# Patient Record
Sex: MALE | Race: WHITE | NOT HISPANIC OR LATINO | URBAN - METROPOLITAN AREA
[De-identification: names, ages, dates, MRNs, and addresses within clinical notes are randomized per-mention and may not be internally consistent; named-entity substitution may affect disease eponyms.]

---

## 2017-01-20 ENCOUNTER — INPATIENT (INPATIENT)
Facility: HOSPITAL | Age: 82
LOS: 1 days | Discharge: HOME | End: 2017-01-22
Attending: INTERNAL MEDICINE | Admitting: INTERNAL MEDICINE
Payer: MEDICARE

## 2017-01-20 VITALS
SYSTOLIC BLOOD PRESSURE: 123 MMHG | TEMPERATURE: 210 F | HEART RATE: 103 BPM | OXYGEN SATURATION: 91 % | DIASTOLIC BLOOD PRESSURE: 76 MMHG | RESPIRATION RATE: 22 BRPM

## 2017-01-20 DIAGNOSIS — Z93.1 GASTROSTOMY STATUS: Chronic | ICD-10-CM

## 2017-01-20 DIAGNOSIS — J06.9 ACUTE UPPER RESPIRATORY INFECTION, UNSPECIFIED: ICD-10-CM

## 2017-01-20 DIAGNOSIS — I48.91 UNSPECIFIED ATRIAL FIBRILLATION: ICD-10-CM

## 2017-01-20 DIAGNOSIS — E86.0 DEHYDRATION: ICD-10-CM

## 2017-01-20 DIAGNOSIS — R13.10 DYSPHAGIA, UNSPECIFIED: ICD-10-CM

## 2017-01-20 DIAGNOSIS — I63.9 CEREBRAL INFARCTION, UNSPECIFIED: ICD-10-CM

## 2017-01-20 LAB
ALBUMIN SERPL ELPH-MCNC: 4 G/DL — SIGNIFICANT CHANGE UP (ref 3.3–5)
ALP SERPL-CCNC: 84 U/L — SIGNIFICANT CHANGE UP (ref 40–120)
ALT FLD-CCNC: 10 U/L — SIGNIFICANT CHANGE UP (ref 4–41)
APPEARANCE UR: CLEAR — SIGNIFICANT CHANGE UP
APTT BLD: 29.9 SEC — SIGNIFICANT CHANGE UP (ref 27.5–37.4)
AST SERPL-CCNC: 32 U/L — SIGNIFICANT CHANGE UP (ref 4–40)
B PERT DNA SPEC QL NAA+PROBE: SIGNIFICANT CHANGE UP
BACTERIA # UR AUTO: SIGNIFICANT CHANGE UP
BASE EXCESS BLDV CALC-SCNC: 4.8 MMOL/L — SIGNIFICANT CHANGE UP
BASOPHILS # BLD AUTO: 0.03 K/UL — SIGNIFICANT CHANGE UP (ref 0–0.2)
BASOPHILS NFR BLD AUTO: 0.3 % — SIGNIFICANT CHANGE UP (ref 0–2)
BILIRUB SERPL-MCNC: 0.4 MG/DL — SIGNIFICANT CHANGE UP (ref 0.2–1.2)
BILIRUB UR-MCNC: NEGATIVE — SIGNIFICANT CHANGE UP
BLOOD GAS VENOUS - CREATININE: 0.59 MG/DL — SIGNIFICANT CHANGE UP (ref 0.5–1.3)
BLOOD UR QL VISUAL: NEGATIVE — SIGNIFICANT CHANGE UP
BUN SERPL-MCNC: 16 MG/DL — SIGNIFICANT CHANGE UP (ref 7–23)
BUN SERPL-MCNC: 16 MG/DL — SIGNIFICANT CHANGE UP (ref 7–23)
C PNEUM DNA SPEC QL NAA+PROBE: NOT DETECTED — SIGNIFICANT CHANGE UP
CALCIUM SERPL-MCNC: 9.1 MG/DL — SIGNIFICANT CHANGE UP (ref 8.4–10.5)
CALCIUM SERPL-MCNC: 9.1 MG/DL — SIGNIFICANT CHANGE UP (ref 8.4–10.5)
CHLORIDE BLDV-SCNC: 106 MMOL/L — SIGNIFICANT CHANGE UP (ref 96–108)
CHLORIDE SERPL-SCNC: 100 MMOL/L — SIGNIFICANT CHANGE UP (ref 98–107)
CHLORIDE SERPL-SCNC: 101 MMOL/L — SIGNIFICANT CHANGE UP (ref 98–107)
CK MB BLD-MCNC: 1.91 NG/ML — SIGNIFICANT CHANGE UP (ref 1–6.6)
CK MB BLD-MCNC: SIGNIFICANT CHANGE UP (ref 0–2.5)
CK SERPL-CCNC: 123 U/L — SIGNIFICANT CHANGE UP (ref 30–200)
CO2 SERPL-SCNC: 21 MMOL/L — LOW (ref 22–31)
CO2 SERPL-SCNC: 28 MMOL/L — SIGNIFICANT CHANGE UP (ref 22–31)
COLOR SPEC: YELLOW — SIGNIFICANT CHANGE UP
CREAT SERPL-MCNC: 0.61 MG/DL — SIGNIFICANT CHANGE UP (ref 0.5–1.3)
CREAT SERPL-MCNC: 0.62 MG/DL — SIGNIFICANT CHANGE UP (ref 0.5–1.3)
EOSINOPHIL # BLD AUTO: 0.22 K/UL — SIGNIFICANT CHANGE UP (ref 0–0.5)
EOSINOPHIL NFR BLD AUTO: 2.2 % — SIGNIFICANT CHANGE UP (ref 0–6)
FLUAV H1 2009 PAND RNA SPEC QL NAA+PROBE: NOT DETECTED — SIGNIFICANT CHANGE UP
FLUAV H1 RNA SPEC QL NAA+PROBE: NOT DETECTED — SIGNIFICANT CHANGE UP
FLUAV H3 RNA SPEC QL NAA+PROBE: NOT DETECTED — SIGNIFICANT CHANGE UP
FLUAV SUBTYP SPEC NAA+PROBE: SIGNIFICANT CHANGE UP
FLUBV RNA SPEC QL NAA+PROBE: NOT DETECTED — SIGNIFICANT CHANGE UP
GAS PNL BLDV: 135 MMOL/L — LOW (ref 136–146)
GLUCOSE BLDV-MCNC: 107 — HIGH (ref 70–99)
GLUCOSE SERPL-MCNC: 102 MG/DL — HIGH (ref 70–99)
GLUCOSE SERPL-MCNC: 106 MG/DL — HIGH (ref 70–99)
GLUCOSE UR-MCNC: NEGATIVE — SIGNIFICANT CHANGE UP
HADV DNA SPEC QL NAA+PROBE: NOT DETECTED — SIGNIFICANT CHANGE UP
HCO3 BLDV-SCNC: 28 MMOL/L — HIGH (ref 20–27)
HCOV 229E RNA SPEC QL NAA+PROBE: NOT DETECTED — SIGNIFICANT CHANGE UP
HCOV HKU1 RNA SPEC QL NAA+PROBE: NOT DETECTED — SIGNIFICANT CHANGE UP
HCOV NL63 RNA SPEC QL NAA+PROBE: NOT DETECTED — SIGNIFICANT CHANGE UP
HCOV OC43 RNA SPEC QL NAA+PROBE: NOT DETECTED — SIGNIFICANT CHANGE UP
HCT VFR BLD CALC: 38.8 % — LOW (ref 39–50)
HCT VFR BLD CALC: 40.1 % — SIGNIFICANT CHANGE UP (ref 39–50)
HCT VFR BLDV CALC: 38.2 % — LOW (ref 39–51)
HGB BLD-MCNC: 12.3 G/DL — LOW (ref 13–17)
HGB BLD-MCNC: 12.7 G/DL — LOW (ref 13–17)
HGB BLDV-MCNC: 12.4 G/DL — LOW (ref 13–17)
HMPV RNA SPEC QL NAA+PROBE: NOT DETECTED — SIGNIFICANT CHANGE UP
HPIV1 RNA SPEC QL NAA+PROBE: NOT DETECTED — SIGNIFICANT CHANGE UP
HPIV2 RNA SPEC QL NAA+PROBE: NOT DETECTED — SIGNIFICANT CHANGE UP
HPIV3 RNA SPEC QL NAA+PROBE: NOT DETECTED — SIGNIFICANT CHANGE UP
HPIV4 RNA SPEC QL NAA+PROBE: NOT DETECTED — SIGNIFICANT CHANGE UP
IMM GRANULOCYTES NFR BLD AUTO: 0.1 % — SIGNIFICANT CHANGE UP (ref 0–1.5)
INR BLD: 2.37 — HIGH (ref 0.87–1.18)
KETONES UR-MCNC: NEGATIVE — SIGNIFICANT CHANGE UP
LACTATE BLDV-MCNC: 2 MMOL/L — SIGNIFICANT CHANGE UP (ref 0.5–2)
LACTATE SERPL-SCNC: 1.1 MMOL/L — SIGNIFICANT CHANGE UP (ref 0.5–2)
LEUKOCYTE ESTERASE UR-ACNC: HIGH
LYMPHOCYTES # BLD AUTO: 1.5 K/UL — SIGNIFICANT CHANGE UP (ref 1–3.3)
LYMPHOCYTES # BLD AUTO: 15 % — SIGNIFICANT CHANGE UP (ref 13–44)
M PNEUMO DNA SPEC QL NAA+PROBE: NOT DETECTED — SIGNIFICANT CHANGE UP
MCHC RBC-ENTMCNC: 29.8 PG — SIGNIFICANT CHANGE UP (ref 27–34)
MCHC RBC-ENTMCNC: 30.1 PG — SIGNIFICANT CHANGE UP (ref 27–34)
MCHC RBC-ENTMCNC: 31.7 % — LOW (ref 32–36)
MCHC RBC-ENTMCNC: 31.7 % — LOW (ref 32–36)
MCV RBC AUTO: 94.1 FL — SIGNIFICANT CHANGE UP (ref 80–100)
MCV RBC AUTO: 95.1 FL — SIGNIFICANT CHANGE UP (ref 80–100)
MONOCYTES # BLD AUTO: 1.15 K/UL — HIGH (ref 0–0.9)
MONOCYTES NFR BLD AUTO: 11.5 % — SIGNIFICANT CHANGE UP (ref 2–14)
MUCOUS THREADS # UR AUTO: SIGNIFICANT CHANGE UP
NEUTROPHILS # BLD AUTO: 7.1 K/UL — SIGNIFICANT CHANGE UP (ref 1.8–7.4)
NEUTROPHILS NFR BLD AUTO: 70.9 % — SIGNIFICANT CHANGE UP (ref 43–77)
NITRITE UR-MCNC: NEGATIVE — SIGNIFICANT CHANGE UP
PCO2 BLDV: 49 MMHG — SIGNIFICANT CHANGE UP (ref 41–51)
PH BLDV: 7.4 PH — SIGNIFICANT CHANGE UP (ref 7.32–7.43)
PH UR: 6 — SIGNIFICANT CHANGE UP (ref 4.6–8)
PLATELET # BLD AUTO: 176 K/UL — SIGNIFICANT CHANGE UP (ref 150–400)
PLATELET # BLD AUTO: 180 K/UL — SIGNIFICANT CHANGE UP (ref 150–400)
PMV BLD: 12.4 FL — SIGNIFICANT CHANGE UP (ref 7–13)
PMV BLD: 12.8 FL — SIGNIFICANT CHANGE UP (ref 7–13)
PO2 BLDV: 52 MMHG — HIGH (ref 35–40)
POTASSIUM BLDV-SCNC: 4.8 MMOL/L — HIGH (ref 3.4–4.5)
POTASSIUM SERPL-MCNC: 4.4 MMOL/L — SIGNIFICANT CHANGE UP (ref 3.5–5.3)
POTASSIUM SERPL-MCNC: 4.9 MMOL/L — SIGNIFICANT CHANGE UP (ref 3.5–5.3)
POTASSIUM SERPL-SCNC: 4.4 MMOL/L — SIGNIFICANT CHANGE UP (ref 3.5–5.3)
POTASSIUM SERPL-SCNC: 4.9 MMOL/L — SIGNIFICANT CHANGE UP (ref 3.5–5.3)
PROT SERPL-MCNC: 7.9 G/DL — SIGNIFICANT CHANGE UP (ref 6–8.3)
PROT UR-MCNC: 30 — SIGNIFICANT CHANGE UP
PROTHROM AB SERPL-ACNC: 27.2 SEC — HIGH (ref 10–13.1)
RBC # BLD: 4.08 M/UL — LOW (ref 4.2–5.8)
RBC # BLD: 4.26 M/UL — SIGNIFICANT CHANGE UP (ref 4.2–5.8)
RBC # FLD: 15 % — HIGH (ref 10.3–14.5)
RBC # FLD: 15 % — HIGH (ref 10.3–14.5)
RBC CASTS # UR COMP ASSIST: HIGH (ref 0–?)
RSV RNA SPEC QL NAA+PROBE: NOT DETECTED — SIGNIFICANT CHANGE UP
RV+EV RNA SPEC QL NAA+PROBE: POSITIVE — HIGH
SAO2 % BLDV: 85.7 % — HIGH (ref 60–85)
SODIUM SERPL-SCNC: 140 MMOL/L — SIGNIFICANT CHANGE UP (ref 135–145)
SODIUM SERPL-SCNC: 141 MMOL/L — SIGNIFICANT CHANGE UP (ref 135–145)
SP GR SPEC: 1.02 — SIGNIFICANT CHANGE UP (ref 1–1.03)
SQUAMOUS # UR AUTO: SIGNIFICANT CHANGE UP
TROPONIN T SERPL-MCNC: < 0.06 NG/ML — SIGNIFICANT CHANGE UP (ref 0–0.06)
UROBILINOGEN FLD QL: 1 E.U. — SIGNIFICANT CHANGE UP (ref 0.1–0.2)
WBC # BLD: 10.01 K/UL — SIGNIFICANT CHANGE UP (ref 3.8–10.5)
WBC # BLD: 8.68 K/UL — SIGNIFICANT CHANGE UP (ref 3.8–10.5)
WBC # FLD AUTO: 10.01 K/UL — SIGNIFICANT CHANGE UP (ref 3.8–10.5)
WBC # FLD AUTO: 8.68 K/UL — SIGNIFICANT CHANGE UP (ref 3.8–10.5)
WBC UR QL: HIGH (ref 0–?)

## 2017-01-20 PROCEDURE — 71010: CPT | Mod: 26

## 2017-01-20 PROCEDURE — 99223 1ST HOSP IP/OBS HIGH 75: CPT

## 2017-01-20 RX ORDER — SENNA PLUS 8.6 MG/1
10 TABLET ORAL
Qty: 0 | Refills: 0 | Status: DISCONTINUED | OUTPATIENT
Start: 2017-01-20 | End: 2017-01-22

## 2017-01-20 RX ORDER — WARFARIN SODIUM 2.5 MG/1
6 TABLET ORAL ONCE
Qty: 0 | Refills: 0 | Status: COMPLETED | OUTPATIENT
Start: 2017-01-20 | End: 2017-01-20

## 2017-01-20 RX ORDER — RISPERIDONE 4 MG/1
0.5 TABLET ORAL
Qty: 0 | Refills: 0 | Status: DISCONTINUED | OUTPATIENT
Start: 2017-01-20 | End: 2017-01-22

## 2017-01-20 RX ORDER — VALPROIC ACID (AS SODIUM SALT) 250 MG/5ML
250 SOLUTION, ORAL ORAL
Qty: 0 | Refills: 0 | Status: DISCONTINUED | OUTPATIENT
Start: 2017-01-20 | End: 2017-01-20

## 2017-01-20 RX ORDER — ACETAMINOPHEN 500 MG
325 TABLET ORAL EVERY 6 HOURS
Qty: 0 | Refills: 0 | Status: DISCONTINUED | OUTPATIENT
Start: 2017-01-20 | End: 2017-01-20

## 2017-01-20 RX ORDER — ENOXAPARIN SODIUM 100 MG/ML
40 INJECTION SUBCUTANEOUS EVERY 24 HOURS
Qty: 0 | Refills: 0 | Status: DISCONTINUED | OUTPATIENT
Start: 2017-01-20 | End: 2017-01-20

## 2017-01-20 RX ORDER — DOCUSATE SODIUM 100 MG
25 CAPSULE ORAL
Qty: 0 | Refills: 0 | COMMUNITY

## 2017-01-20 RX ORDER — PANTOPRAZOLE SODIUM 20 MG/1
40 TABLET, DELAYED RELEASE ORAL DAILY
Qty: 0 | Refills: 0 | Status: DISCONTINUED | OUTPATIENT
Start: 2017-01-20 | End: 2017-01-22

## 2017-01-20 RX ORDER — MIRTAZAPINE 45 MG/1
15 TABLET, ORALLY DISINTEGRATING ORAL
Qty: 0 | Refills: 0 | Status: DISCONTINUED | OUTPATIENT
Start: 2017-01-20 | End: 2017-01-22

## 2017-01-20 RX ORDER — IPRATROPIUM/ALBUTEROL SULFATE 18-103MCG
3 AEROSOL WITH ADAPTER (GRAM) INHALATION ONCE
Qty: 0 | Refills: 0 | Status: COMPLETED | OUTPATIENT
Start: 2017-01-20 | End: 2017-01-20

## 2017-01-20 RX ORDER — ACETAMINOPHEN 500 MG
650 TABLET ORAL EVERY 6 HOURS
Qty: 0 | Refills: 0 | Status: DISCONTINUED | OUTPATIENT
Start: 2017-01-20 | End: 2017-01-22

## 2017-01-20 RX ORDER — SODIUM CHLORIDE 9 MG/ML
1000 INJECTION INTRAMUSCULAR; INTRAVENOUS; SUBCUTANEOUS
Qty: 0 | Refills: 0 | Status: DISCONTINUED | OUTPATIENT
Start: 2017-01-20 | End: 2017-01-22

## 2017-01-20 RX ORDER — SENNA PLUS 8.6 MG/1
5 TABLET ORAL
Qty: 0 | Refills: 0 | COMMUNITY

## 2017-01-20 RX ADMIN — Medication 3 MILLILITER(S): at 08:00

## 2017-01-20 RX ADMIN — WARFARIN SODIUM 6 MILLIGRAM(S): 2.5 TABLET ORAL at 19:14

## 2017-01-20 RX ADMIN — SODIUM CHLORIDE 60 MILLILITER(S): 9 INJECTION INTRAMUSCULAR; INTRAVENOUS; SUBCUTANEOUS at 19:05

## 2017-01-20 RX ADMIN — SODIUM CHLORIDE 60 MILLILITER(S): 9 INJECTION INTRAMUSCULAR; INTRAVENOUS; SUBCUTANEOUS at 21:03

## 2017-01-20 NOTE — ED PROVIDER NOTE - OBJECTIVE STATEMENT
81yom w/ Afib, CVA w/ residual right sided hemiparesis p/w cough and dyspnea since last night. Per home aide, pt was coughing through the night, productive of scant clear sputum, and then this morning it looked like he was short of breath so EMS was called. Pt minimally verbal at baseline, provides no history. No known fevers, vomiting, diarrhea, strong smelling urine 81yom w/ Afib, CVA w/ residual right sided hemiparesis, dysphagia/chronic aspiration w/ PEG p/w cough and dyspnea since last night. Per home aide, pt was coughing through the night, productive of scant clear sputum, and then this morning it looked like he was short of breath so EMS was called. Pt minimally communicative at baseline, provides no history. No known fevers, vomiting, diarrhea, strong smelling urine. No sick contacts. No other recent illness. No loss of consciousness or change in mental status. Former smoker, no known lung disease. DNR/DNI per daughter/ primary care taker. Pt tachypneic w/ noisy breathing however daughter states that this is not a significant deviation from baseline. 81yom w/ Afib on coumadin, CVA w/ residual right sided hemiparesis, dysphagia/chronic aspiration w/ PEG p/w cough and dyspnea since last night. Per home aide, pt was coughing through the night, productive of scant clear sputum, and then this morning it looked like he was short of breath so EMS was called. Pt minimally communicative at baseline, provides no history. No known fevers, vomiting, diarrhea, strong smelling urine. No sick contacts. No other recent illness. No loss of consciousness or change in mental status. Former smoker, no known lung disease. DNR/DNI per wife Js [phone].  bedbound, 24hr aide  +hx prior intubations

## 2017-01-20 NOTE — ED PROVIDER NOTE - PHYSICAL EXAMINATION
very slight tachypnea, daughter states this is how he always looks.  unable to move b/l LE, trace b/l LE edema, non verbal, dose not follow commands - this is his baseline.

## 2017-01-20 NOTE — ED ADULT TRIAGE NOTE - CHIEF COMPLAINT QUOTE
Patient arrives from home with ems for a cough  and sob. Has a h/o a CVA , right sided weakness, non verbal, peg tube, diaper. skin intact. O2 sat 91 % on RA. Placed on 2 L nasal canula. Now O2 sat 97. Coughing in triage, sob.

## 2017-01-20 NOTE — ED PROVIDER NOTE - ATTENDING CONTRIBUTION TO CARE
81M afib on coumadin. Confirmed DNR/DNI status. Slight tachy, 100 rectal temp, initial triage sat of 91RA now 96RA. Exam slight tachypnea and noisy breathing, otherwise at his baseline.  ddx: URI vs. PNA   CBC, cmp, VBG comp, CXR, RVP, EKG. Trial neb given hx of smoking. Reassess. Likely admission at least for supportive care (given pt's tenuous overall resp status) unless pt asymptomatic.

## 2017-01-20 NOTE — ED ADULT NURSE NOTE - OBJECTIVE STATEMENT
Patient arrived by EMS with SOB, productive cough since yesterday with white sputum, the private aid at home was concerned because "he was coughing a lot and he was sweating", unaware if febrile. Patient nonverbal, UEs contracted, bedbound, he transfers to the chair by lift at home. Incontinent bowel/bladder. Family member and private aid at the bedside. Patient is NPO, peg tube feeding. Patient arrived by EMS with SOB, productive cough since yesterday with white sputum, the private aid at home was concerned because "he was coughing a lot and he was sweating", unaware if febrile. Patient nonverbal,  R UE contracted, bedbound, he transfers to the chair by lift at home. Incontinent bowel/bladder. Family member and private aid at the bedside. Patient is NPO, peg tube feeding. Patient has blanching erythema on both buttocks area, right side hemiparesis. 20 gauge placed in left wrist.

## 2017-01-20 NOTE — ED PROVIDER NOTE - BREATH SOUNDS
transmitted upper airway sounds, no focal airways of consolidation transmitted upper airway sounds, scattered coarse breath sounds

## 2017-01-20 NOTE — H&P ADULT. - HISTORY OF PRESENT ILLNESS
81M h/o CVA 11 years ago, dysphagia/aspiration, PEG placed 2015, on home O2 PRN (when he looks SOB), atrial fibrillation, minimally verbal for past 6 months, dependent for all ADLs, has 24/7 HHA, aide noted been coughing for past day.  No fever or chills but sweating.  No moaning or pain noted.  Patient unable to answer questions.    ED:  98.2, 103, 100.0, 123/76, 22, 91RA -->98% 3L --> + rhinovirus, IVFs Hx from daughters at bedside, pt unable to answer questions.    81M h/o CVA 11 years ago, dysphagia/aspiration, PEG placed 2015, on home O2 PRN (when he looks SOB), atrial fibrillation, minimally verbal for past 6 months, dependent for all ADLs, has 24/7 HHA, aide noted been coughing for past day.  No fever or chills but sweating.  No moaning or pain noted.  Patient unable to answer questions.    ED:  98.2, 103, 100.0, 123/76, 22, 91RA -->98% 3L --> + rhinovirus, IVFs

## 2017-01-20 NOTE — ED PROVIDER NOTE - MEDICAL DECISION MAKING DETAILS
81yom w/ previous CVA, PEG, aspiration risk p/w coughing and increased work of breathing, transiently hypoxic in triage to 91, now w/ SpO2 96% on RA. Trial duoneb, CXR, labs, enzymes, UA, RVP.

## 2017-01-20 NOTE — ED PROVIDER NOTE - CARE PLAN
Principal Discharge DX:	Cough Principal Discharge DX:	Upper respiratory tract infection, unspecified type  Secondary Diagnosis:	PEG tube malfunction

## 2017-01-20 NOTE — H&P ADULT. - ASSESSMENT
81M h/o CVA 11 years ago, dysphagia/aspiration, PEG placed 2015, on home O2 PRN (when he looks SOB), atrial fibrillation, minimally verbal for past 6 months, dependent for all ADLs, has 24/7 HHA, aide noted been coughing for past day.  No fever or chills but sweating.  No moaning or pain noted.  Patient unable to answer questions.    ED:  98.2, 103, 100.0, 123/76, 22, 91RA -->98% 3L --> + rhinovirus, IVFs 81M h/o CVA 11 years ago, dysphagia/aspiration, PEG placed 2015, on home O2 PRN (when he looks SOB), atrial fibrillation, minimally verbal for past 6 months, dependent for all ADLs, has 24/7 HHA, aide noted been coughing for past day.  No fever or chills but sweating.  No moaning or pain noted.  Patient unable to answer questions.    ED:  98.2, 103, 100.0, 123/76, 22, 91RA -->98% 3L --> + rhinovirus, IVFs    Rhinovirus, URI, dehydration

## 2017-01-20 NOTE — ED PROVIDER NOTE - PROGRESS NOTE DETAILS
[place]: Awaiting EKG, labs, CXR, RVP, reassessment. Likely admission unless asymptomatic. ben: pt less tachypnic, rr 22, require 3l to so at 98%; wheezing improved. will admit for uri & possibel peg tube change. spoke to dr brandy portillo who rec adm to hospitilist

## 2017-01-20 NOTE — H&P ADULT. - PROBLEM SELECTOR PLAN 4
with chronic behavioral disturbance c/w med for behavior control - (check VA level in am and restart as indicated), risperidone, mirtazapine

## 2017-01-21 LAB
BUN SERPL-MCNC: 15 MG/DL — SIGNIFICANT CHANGE UP (ref 7–23)
CALCIUM SERPL-MCNC: 8.5 MG/DL — SIGNIFICANT CHANGE UP (ref 8.4–10.5)
CHLORIDE SERPL-SCNC: 103 MMOL/L — SIGNIFICANT CHANGE UP (ref 98–107)
CO2 SERPL-SCNC: 25 MMOL/L — SIGNIFICANT CHANGE UP (ref 22–31)
CREAT SERPL-MCNC: 0.47 MG/DL — LOW (ref 0.5–1.3)
GLUCOSE SERPL-MCNC: 102 MG/DL — HIGH (ref 70–99)
HCT VFR BLD CALC: 35.4 % — LOW (ref 39–50)
HGB BLD-MCNC: 11.2 G/DL — LOW (ref 13–17)
INR BLD: 3.29 — HIGH (ref 0.87–1.18)
MCHC RBC-ENTMCNC: 30 PG — SIGNIFICANT CHANGE UP (ref 27–34)
MCHC RBC-ENTMCNC: 31.6 % — LOW (ref 32–36)
MCV RBC AUTO: 94.9 FL — SIGNIFICANT CHANGE UP (ref 80–100)
PLATELET # BLD AUTO: 157 K/UL — SIGNIFICANT CHANGE UP (ref 150–400)
PMV BLD: 12.8 FL — SIGNIFICANT CHANGE UP (ref 7–13)
POTASSIUM SERPL-MCNC: 4.2 MMOL/L — SIGNIFICANT CHANGE UP (ref 3.5–5.3)
POTASSIUM SERPL-SCNC: 4.2 MMOL/L — SIGNIFICANT CHANGE UP (ref 3.5–5.3)
PROTHROM AB SERPL-ACNC: 38 SEC — HIGH (ref 10–13.1)
RBC # BLD: 3.73 M/UL — LOW (ref 4.2–5.8)
RBC # FLD: 15.2 % — HIGH (ref 10.3–14.5)
SODIUM SERPL-SCNC: 140 MMOL/L — SIGNIFICANT CHANGE UP (ref 135–145)
VALPROATE SERPL-MCNC: 12.6 UG/ML — LOW (ref 50–100)
WBC # BLD: 6.27 K/UL — SIGNIFICANT CHANGE UP (ref 3.8–10.5)
WBC # FLD AUTO: 6.27 K/UL — SIGNIFICANT CHANGE UP (ref 3.8–10.5)

## 2017-01-21 PROCEDURE — 99233 SBSQ HOSP IP/OBS HIGH 50: CPT

## 2017-01-21 RX ADMIN — RISPERIDONE 0.5 MILLIGRAM(S): 4 TABLET ORAL at 17:16

## 2017-01-21 RX ADMIN — SODIUM CHLORIDE 60 MILLILITER(S): 9 INJECTION INTRAMUSCULAR; INTRAVENOUS; SUBCUTANEOUS at 11:54

## 2017-01-21 RX ADMIN — MIRTAZAPINE 15 MILLIGRAM(S): 45 TABLET, ORALLY DISINTEGRATING ORAL at 17:16

## 2017-01-21 RX ADMIN — SENNA PLUS 10 MILLILITER(S): 8.6 TABLET ORAL at 05:40

## 2017-01-21 RX ADMIN — SENNA PLUS 10 MILLILITER(S): 8.6 TABLET ORAL at 17:16

## 2017-01-21 RX ADMIN — PANTOPRAZOLE SODIUM 40 MILLIGRAM(S): 20 TABLET, DELAYED RELEASE ORAL at 11:54

## 2017-01-21 NOTE — DISCHARGE NOTE ADULT - PLAN OF CARE
You have a rhinovirus (common cold) infection. Continue with plenty of fluids, tylenol as needed. Should follow with a primary care physician in 2-3 weeks for post-hospitalization follow-up. Be free of infection Maintain INR between 2-3 Your INR level upon discharge is too high (3.29). Please hold coumadin tonight, and restart it tomorrow as usually prescribed. An INR level needs to be checked early next week. Stable

## 2017-01-21 NOTE — DISCHARGE NOTE ADULT - CARE PLAN
Principal Discharge DX:	Upper respiratory tract infection, unspecified type  Goal:	Be free of infection  Instructions for follow-up, activity and diet:	You have a rhinovirus (common cold) infection. Continue with plenty of fluids, tylenol as needed. Should follow with a primary care physician in 2-3 weeks for post-hospitalization follow-up.  Secondary Diagnosis:	Cerebrovascular accident (CVA), unspecified mechanism  Secondary Diagnosis:	Atrial fibrillation, unspecified type Principal Discharge DX:	Upper respiratory tract infection, unspecified type  Goal:	Be free of infection  Instructions for follow-up, activity and diet:	You have a rhinovirus (common cold) infection. Continue with plenty of fluids, tylenol as needed. Should follow with a primary care physician in 2-3 weeks for post-hospitalization follow-up.  Secondary Diagnosis:	Cerebrovascular accident (CVA), unspecified mechanism  Secondary Diagnosis:	Atrial fibrillation, unspecified type  Goal:	Maintain INR between 2-3  Instructions for follow-up, activity and diet:	Your INR level upon discharge is too high (3.29). Please hold coumadin tonight, and restart it tomorrow as usually prescribed. An INR level needs to be checked early next week. Principal Discharge DX:	Upper respiratory tract infection, unspecified type  Goal:	Be free of infection  Instructions for follow-up, activity and diet:	You have a rhinovirus (common cold) infection. Continue with plenty of fluids, tylenol as needed. Should follow with a primary care physician in 2-3 weeks for post-hospitalization follow-up.  Secondary Diagnosis:	Cerebrovascular accident (CVA), unspecified mechanism  Instructions for follow-up, activity and diet:	Stable  Secondary Diagnosis:	Atrial fibrillation, unspecified type  Goal:	Maintain INR between 2-3  Instructions for follow-up, activity and diet:	Your INR level upon discharge is too high (3.29). Please hold coumadin tonight, and restart it tomorrow as usually prescribed. An INR level needs to be checked early next week.

## 2017-01-21 NOTE — DISCHARGE NOTE ADULT - HOSPITAL COURSE
81M h/o CVA 11 years ago, dysphagia/aspiration, PEG placed 2015, on home O2 PRN (when he looks SOB), atrial fibrillation, minimally verbal for past 6 months, dependent for all ADLs, has 24/7 HHA, aide noted been coughing for past day.  No fever or chills but sweating.  No moaning or pain noted.    Remained hemodynamically stable throughout admission. Found to be RVP+ with rhinovirus. CXR clear. Discharged home with home services in improved condition.

## 2017-01-21 NOTE — DISCHARGE NOTE ADULT - PATIENT PORTAL LINK FT
“You can access the [facility] Patient Portal, offered by [facility], by registering with the following website: [email or web address]”

## 2017-01-21 NOTE — DISCHARGE NOTE ADULT - NS AS DC VTE INSTRUCTION
Coumadin/Warfarin - Dietary Advice.../Coumadin/Warfarin - Potential for adverse drug reactions and interactions/Coumadin/Warfarin - Compliance.../Coumadin/Warfarin - Follow-up monitoring...

## 2017-01-21 NOTE — DISCHARGE NOTE ADULT - MEDICATION SUMMARY - MEDICATIONS TO TAKE
I will START or STAY ON the medications listed below when I get home from the hospital:    warfarin 6 mg oral tablet  -- 1 tab(s) by gastrostomy tube once a day  -- Indication: For Atrial fibrillation    valproic acid 250 mg/5 mL oral syrup  -- 5 milliliter(s) by gastrostomy tube once a day  -- Indication: For Stroke    mirtazapine 15 mg oral tablet  -- 1 tab(s) by gastrostomy tube once a day (at bedtime)  -- Indication: For Stroke    risperiDONE 0.5 mg oral tablet  -- 1 tab(s) by gastrostomy tube once a day  -- Indication: For Stroke    senna 8.8 mg/5 mL oral syrup  -- 5 milliliter(s) by gastrostomy tube once a day (at bedtime)  -- Indication: For Constipation     docusate sodium 100 mg/25 mL oral syrup  -- 25 milliliter(s) by gastrostomy tube 2 times a day  -- Indication: For Constipation     PriLOSEC OTC 20 mg oral delayed release tablet  -- 1 tab(s) by gastrostomy tube once a day  -- Indication: For GERD

## 2017-01-22 VITALS
HEART RATE: 81 BPM | DIASTOLIC BLOOD PRESSURE: 74 MMHG | RESPIRATION RATE: 18 BRPM | OXYGEN SATURATION: 97 % | TEMPERATURE: 99 F | SYSTOLIC BLOOD PRESSURE: 124 MMHG

## 2017-01-22 LAB
INR BLD: 2.39 — HIGH (ref 0.87–1.18)
PROTHROM AB SERPL-ACNC: 27.5 SEC — HIGH (ref 10–13.1)

## 2017-01-22 PROCEDURE — 99239 HOSP IP/OBS DSCHRG MGMT >30: CPT

## 2017-01-22 RX ADMIN — SENNA PLUS 10 MILLILITER(S): 8.6 TABLET ORAL at 05:04

## 2017-11-19 ENCOUNTER — INPATIENT (INPATIENT)
Facility: HOSPITAL | Age: 82
LOS: 10 days | Discharge: HOSPICE | End: 2017-11-30
Attending: HOSPITALIST | Admitting: HOSPITALIST
Payer: MEDICARE

## 2017-11-19 VITALS — OXYGEN SATURATION: 99 % | HEART RATE: 110 BPM

## 2017-11-19 DIAGNOSIS — Z93.1 GASTROSTOMY STATUS: Chronic | ICD-10-CM

## 2017-11-19 LAB
ALBUMIN SERPL ELPH-MCNC: 3.9 G/DL — SIGNIFICANT CHANGE UP (ref 3.3–5)
ALP SERPL-CCNC: 88 U/L — SIGNIFICANT CHANGE UP (ref 40–120)
ALT FLD-CCNC: 11 U/L — SIGNIFICANT CHANGE UP (ref 4–41)
ANISOCYTOSIS BLD QL: SLIGHT — SIGNIFICANT CHANGE UP
APPEARANCE UR: CLEAR — SIGNIFICANT CHANGE UP
APTT BLD: 41.7 SEC — HIGH (ref 27.5–37.4)
AST SERPL-CCNC: 19 U/L — SIGNIFICANT CHANGE UP (ref 4–40)
BACTERIA # UR AUTO: SIGNIFICANT CHANGE UP
BASE EXCESS BLDV CALC-SCNC: 5.1 MMOL/L — SIGNIFICANT CHANGE UP
BASOPHILS # BLD AUTO: 0.03 K/UL — SIGNIFICANT CHANGE UP (ref 0–0.2)
BASOPHILS NFR BLD AUTO: 0.5 % — SIGNIFICANT CHANGE UP (ref 0–2)
BASOPHILS NFR SPEC: 1 % — SIGNIFICANT CHANGE UP (ref 0–2)
BILIRUB SERPL-MCNC: 0.3 MG/DL — SIGNIFICANT CHANGE UP (ref 0.2–1.2)
BILIRUB UR-MCNC: NEGATIVE — SIGNIFICANT CHANGE UP
BLOOD GAS VENOUS - CREATININE: 0.46 MG/DL — LOW (ref 0.5–1.3)
BLOOD UR QL VISUAL: HIGH
BUN SERPL-MCNC: 14 MG/DL — SIGNIFICANT CHANGE UP (ref 7–23)
CALCIUM SERPL-MCNC: 8.2 MG/DL — LOW (ref 8.4–10.5)
CHLORIDE BLDV-SCNC: 99 MMOL/L — SIGNIFICANT CHANGE UP (ref 96–108)
CHLORIDE SERPL-SCNC: 94 MMOL/L — LOW (ref 98–107)
CK MB BLD-MCNC: 1.3 NG/ML — SIGNIFICANT CHANGE UP (ref 1–6.6)
CK SERPL-CCNC: 73 U/L — SIGNIFICANT CHANGE UP (ref 30–200)
CO2 SERPL-SCNC: 27 MMOL/L — SIGNIFICANT CHANGE UP (ref 22–31)
COLOR SPEC: YELLOW — SIGNIFICANT CHANGE UP
CREAT SERPL-MCNC: 0.53 MG/DL — SIGNIFICANT CHANGE UP (ref 0.5–1.3)
EOSINOPHIL # BLD AUTO: 0.06 K/UL — SIGNIFICANT CHANGE UP (ref 0–0.5)
EOSINOPHIL NFR BLD AUTO: 0.9 % — SIGNIFICANT CHANGE UP (ref 0–6)
EOSINOPHIL NFR FLD: 0.9 % — SIGNIFICANT CHANGE UP (ref 0–6)
GAS PNL BLDV: 126 MMOL/L — LOW (ref 136–146)
GIANT PLATELETS BLD QL SMEAR: PRESENT — SIGNIFICANT CHANGE UP
GLUCOSE BLDV-MCNC: 114 — HIGH (ref 70–99)
GLUCOSE SERPL-MCNC: 110 MG/DL — HIGH (ref 70–99)
GLUCOSE UR-MCNC: NEGATIVE — SIGNIFICANT CHANGE UP
HCO3 BLDV-SCNC: 28 MMOL/L — HIGH (ref 20–27)
HCT VFR BLD CALC: 39.2 % — SIGNIFICANT CHANGE UP (ref 39–50)
HCT VFR BLDV CALC: 39.1 % — SIGNIFICANT CHANGE UP (ref 39–51)
HGB BLD-MCNC: 12.9 G/DL — LOW (ref 13–17)
HGB BLDV-MCNC: 12.7 G/DL — LOW (ref 13–17)
IMM GRANULOCYTES # BLD AUTO: 0.02 # — SIGNIFICANT CHANGE UP
IMM GRANULOCYTES NFR BLD AUTO: 0.3 % — SIGNIFICANT CHANGE UP (ref 0–1.5)
INR BLD: 2.57 — HIGH (ref 0.88–1.17)
KETONES UR-MCNC: NEGATIVE — SIGNIFICANT CHANGE UP
LACTATE BLDV-MCNC: 1.4 MMOL/L — SIGNIFICANT CHANGE UP (ref 0.5–2)
LEUKOCYTE ESTERASE UR-ACNC: NEGATIVE — SIGNIFICANT CHANGE UP
LYMPHOCYTES # BLD AUTO: 1.4 K/UL — SIGNIFICANT CHANGE UP (ref 1–3.3)
LYMPHOCYTES # BLD AUTO: 21.3 % — SIGNIFICANT CHANGE UP (ref 13–44)
LYMPHOCYTES NFR SPEC AUTO: 17.1 % — SIGNIFICANT CHANGE UP (ref 13–44)
MACROCYTES BLD QL: SLIGHT — SIGNIFICANT CHANGE UP
MCHC RBC-ENTMCNC: 30.8 PG — SIGNIFICANT CHANGE UP (ref 27–34)
MCHC RBC-ENTMCNC: 32.9 % — SIGNIFICANT CHANGE UP (ref 32–36)
MCV RBC AUTO: 93.6 FL — SIGNIFICANT CHANGE UP (ref 80–100)
MONOCYTES # BLD AUTO: 1.16 K/UL — HIGH (ref 0–0.9)
MONOCYTES NFR BLD AUTO: 17.7 % — HIGH (ref 2–14)
MONOCYTES NFR BLD: 14.3 % — HIGH (ref 2–9)
MUCOUS THREADS # UR AUTO: SIGNIFICANT CHANGE UP
NEUTROPHIL AB SER-ACNC: 60.9 % — SIGNIFICANT CHANGE UP (ref 43–77)
NEUTROPHILS # BLD AUTO: 3.9 K/UL — SIGNIFICANT CHANGE UP (ref 1.8–7.4)
NEUTROPHILS NFR BLD AUTO: 59.3 % — SIGNIFICANT CHANGE UP (ref 43–77)
NEUTS BAND # BLD: 1 % — SIGNIFICANT CHANGE UP (ref 0–6)
NITRITE UR-MCNC: NEGATIVE — SIGNIFICANT CHANGE UP
NRBC # FLD: 0 — SIGNIFICANT CHANGE UP
PCO2 BLDV: 53 MMHG — HIGH (ref 41–51)
PH BLDV: 7.38 PH — SIGNIFICANT CHANGE UP (ref 7.32–7.43)
PH UR: 6.5 — SIGNIFICANT CHANGE UP (ref 4.6–8)
PLATELET # BLD AUTO: 150 K/UL — SIGNIFICANT CHANGE UP (ref 150–400)
PLATELET COUNT - ESTIMATE: SIGNIFICANT CHANGE UP
PMV BLD: 12.4 FL — SIGNIFICANT CHANGE UP (ref 7–13)
PO2 BLDV: 56 MMHG — HIGH (ref 35–40)
POTASSIUM BLDV-SCNC: 3.9 MMOL/L — SIGNIFICANT CHANGE UP (ref 3.4–4.5)
POTASSIUM SERPL-MCNC: 4.3 MMOL/L — SIGNIFICANT CHANGE UP (ref 3.5–5.3)
POTASSIUM SERPL-SCNC: 4.3 MMOL/L — SIGNIFICANT CHANGE UP (ref 3.5–5.3)
PROT SERPL-MCNC: 7.4 G/DL — SIGNIFICANT CHANGE UP (ref 6–8.3)
PROT UR-MCNC: 30 — SIGNIFICANT CHANGE UP
PROTHROM AB SERPL-ACNC: 29.3 SEC — HIGH (ref 9.8–13.1)
RBC # BLD: 4.19 M/UL — LOW (ref 4.2–5.8)
RBC # FLD: 14.3 % — SIGNIFICANT CHANGE UP (ref 10.3–14.5)
RBC CASTS # UR COMP ASSIST: >50 — HIGH (ref 0–?)
SAO2 % BLDV: 87.3 % — HIGH (ref 60–85)
SODIUM SERPL-SCNC: 134 MMOL/L — LOW (ref 135–145)
SP GR SPEC: 1.02 — SIGNIFICANT CHANGE UP (ref 1–1.03)
TROPONIN T SERPL-MCNC: < 0.06 NG/ML — SIGNIFICANT CHANGE UP (ref 0–0.06)
UROBILINOGEN FLD QL: 2 E.U. — SIGNIFICANT CHANGE UP (ref 0.1–0.2)
VARIANT LYMPHS # BLD: 4.8 % — SIGNIFICANT CHANGE UP
WBC # BLD: 6.57 K/UL — SIGNIFICANT CHANGE UP (ref 3.8–10.5)
WBC # FLD AUTO: 6.57 K/UL — SIGNIFICANT CHANGE UP (ref 3.8–10.5)
WBC UR QL: HIGH (ref 0–?)

## 2017-11-19 PROCEDURE — 71010: CPT | Mod: 26

## 2017-11-19 RX ORDER — ACETAMINOPHEN 500 MG
1000 TABLET ORAL ONCE
Qty: 0 | Refills: 0 | Status: COMPLETED | OUTPATIENT
Start: 2017-11-19 | End: 2017-11-19

## 2017-11-19 RX ORDER — SODIUM CHLORIDE 9 MG/ML
1000 INJECTION INTRAMUSCULAR; INTRAVENOUS; SUBCUTANEOUS ONCE
Qty: 0 | Refills: 0 | Status: COMPLETED | OUTPATIENT
Start: 2017-11-19 | End: 2017-11-19

## 2017-11-19 RX ORDER — IPRATROPIUM/ALBUTEROL SULFATE 18-103MCG
3 AEROSOL WITH ADAPTER (GRAM) INHALATION ONCE
Qty: 0 | Refills: 0 | Status: COMPLETED | OUTPATIENT
Start: 2017-11-19 | End: 2017-11-19

## 2017-11-19 RX ADMIN — SODIUM CHLORIDE 1000 MILLILITER(S): 9 INJECTION INTRAMUSCULAR; INTRAVENOUS; SUBCUTANEOUS at 22:06

## 2017-11-19 RX ADMIN — SODIUM CHLORIDE 1000 MILLILITER(S): 9 INJECTION INTRAMUSCULAR; INTRAVENOUS; SUBCUTANEOUS at 23:05

## 2017-11-19 RX ADMIN — Medication 100 MILLIGRAM(S): at 22:10

## 2017-11-19 RX ADMIN — Medication 400 MILLIGRAM(S): at 22:06

## 2017-11-19 RX ADMIN — Medication 3 MILLILITER(S): at 22:06

## 2017-11-19 NOTE — ED PROVIDER NOTE - CRITICAL CARE PROVIDED
documentation/consult w/ pt's family directly relating to pts condition/consultation with other physicians/conducted a detailed discussion of DNR status/interpretation of diagnostic studies/additional history taking/direct patient care (not related to procedure)

## 2017-11-19 NOTE — ED PROVIDER NOTE - PROGRESS NOTE DETAILS
Labs show elevated INR, treated with vanc & aztreonam due to likely aspiration PNA. O2 sats improved on CPAP, weaned off to NRB. Seen by MICU who did not believe pt needed ICU at this time, will admit to hospitalist

## 2017-11-19 NOTE — ED ADULT NURSE NOTE - OBJECTIVE STATEMENT
Pt received to Tr [facility] as notification for SOB and cough today. Pt arrives from home noted to be unable to handle to secretions with respirations labored. Pt nonverbal at baseline with right arm contracture s/p CVA. Blanchable reddness noted to buttocks area. Placed on Bipap as ordered by provider. VS noted. Family at bedside. Will continue to monitor. Pt received to Tr LUCY as notification for SOB and cough today. Pt arrives from home noted to be unable to handle to secretions with respirations labored. Pt nonverbal at baseline with right arm contracture s/p CVA. Blanchable reddness noted to buttocks area. Placed on Bipap as ordered by provider. Pt arrives w/ PEG tube in place. VS noted. Family at bedside. Will continue to monitor.

## 2017-11-19 NOTE — ED PROVIDER NOTE - OBJECTIVE STATEMENT
82M p/w BIBEMS as notification for respiratory distress.  Pt is nonverbal and does not follow commands, son who arrived later provided history.  Pt today began sneezing, cough, breathing heavily, a/w sweating and a productive cough.  Pt at baseline is nonverbal due to CVAs, with contractures, on a hospital bed at home, with 3 aides round the clock.  Son told EMS not to intubate when they asked.  EMS noted rhonchi bilat, sat 85% RA with an ETCO2 of 50.  They report h/o afib, CVA, on coumadin.  VS:  fever, tachycardia, tachypnea.   BP adequate.    GEN - Cough, gurgling.  Paradoxical respirations.   Abd breathing.   Eyes closed, not responding to commands.   HEAD - NC/AT     ENT - PEERL, EOMI, mucous membranes  dry , no discharge      NECK: Neck supple, non-tender without lymphadenopathy, no masses, no JVD  PULM - Bilat crackles throughout,  symmetric breath sounds  COR -  normal heart sounds    ABD - , ND, NT, soft, no guarding, no rebound, no masses  PEG tube site c/d/i.   BACK - no CVA tenderness, nontender spine   Redness at sacrum, no skin breakdown.   EXTREMS - no edema, no deformity, warm and well perfused  contractures of all 4 extremities.   SKIN - no rash or bruising      NEUROLOGIC - Contracted, not following commands, nonverbal.  (+)gag.       IMP:  82m p/w fever, cough, hypoxia.  Likely PNA, most likely due to aspiration.  Septic.  Rx abx, check labs incl cultures, lactate and cath urine.  Discussion had with son - despite no previous discussion with FA, son would like to make pt DNR/DNI, a decision I fully support and endorse, given the pt's poor baseline mental status.  Full medical care will be provided including Abx, fluids, IV access, CXR, CPAP to aid in work of breathing.  Admit.

## 2017-11-19 NOTE — CONSULT NOTE ADULT - ASSESSMENT
82M h/o A-fib, CVA with residual R hemiparesis, dementia, non-verbal, s/p PEG tube, with 24-hour care who presents to the ED with SOB found to be in septic shock 2/2 presumed PNA.  Patient with hypotension after being placed on BiPAP and in the setting of septic shock.  Would initiate aggressive fluid resuscitation at this time, treat underlying infectious etiology.  Does not warrant MICU admission at this time, but will re-assess after fluid resuscitation.    #Neuro - non-verbal with dementia at baseline, pt's son states he is at baseline mental status at this time, continue to monitor for acute changes    #CV - septic shock 2/2 likely PNA, continue aggressive fluid resuscitation, would start standing IVF, consider midodrine    #Resp - pt weaned off BiPAP, satting acceptably on NRB    #GI - no acute issues    #Renal - no acute issues    #ID - septic shock 2/2 likely PNA, UA not remarkable, f/u BCx - would also check RVP    #Endo - no acute issues    #Heme - mild anemia    #FEN - NPO given clinical status    #DVT PPX - on therapeutic AC for A-fib    #Code Status - son reiterated DNR/DNI status during discussion with me, son also expressed a desire to keep patient comfortable and did not want invasive measures taken    Final recommendations pending discussion with MICU attending.    Dimple Smith MD  PGY-2 | Internal Medicine  [phone] / [phone] 82M h/o A-fib, CVA with residual R hemiparesis, dementia, non-verbal, s/p PEG tube, with 24-hour care who presents to the ED with SOB found to be in septic shock 2/2 presumed PNA.  Patient with hypotension after being placed on BiPAP and in the setting of septic shock.  Would initiate aggressive fluid resuscitation at this time, treat underlying infectious etiology.  Does not warrant MICU admission at this time, but will re-assess after fluid resuscitation.    #Neuro - non-verbal with dementia at baseline, pt's son states he is at baseline mental status at this time, continue to monitor for acute changes    #CV - septic shock 2/2 likely PNA, continue aggressive fluid resuscitation, would start standing IVF, consider midodrine    #Resp - pt weaned off BiPAP, satting acceptably on NRB, wean O2 as tolerated    #GI - no acute issues    #Renal - no acute issues    #ID - septic shock 2/2 likely PNA, UA not remarkable, f/u BCx - would also check RVP    #Endo - no acute issues    #Heme - mild anemia    #FEN - NPO given clinical status    #DVT PPX - on therapeutic AC for A-fib    #Code Status - son reiterated DNR/DNI status during discussion with me, son also expressed a desire to keep patient comfortable and did not want invasive measures taken    Final recommendations pending discussion with MICU attending.    Dimple Smith MD  PGY-2 | Internal Medicine  [phone] / [phone] 82M h/o A-fib, CVA with residual R hemiparesis, dementia, non-verbal, s/p PEG tube, with 24-hour care who presents to the ED with SOB found to be in septic shock 2/2 presumed PNA.  Patient with hypotension after being placed on BiPAP and in the setting of septic shock.  Would initiate aggressive fluid resuscitation at this time, treat underlying infectious etiology.  Does not warrant MICU admission at this time.    #Neuro - non-verbal with dementia at baseline, pt's son states he is at baseline mental status at this time, continue to monitor for acute changes    #CV - septic shock 2/2 likely PNA, continue aggressive fluid resuscitation, would start standing IVF, consider midodrine    #Resp - pt weaned off BiPAP, satting acceptably on NRB, wean O2 as tolerated    #GI - no acute issues    #Renal - no acute issues    #ID - septic shock 2/2 likely PNA, UA not remarkable, f/u BCx - would also check RVP    #Endo - no acute issues    #Heme - mild anemia    #FEN - NPO given clinical status    #DVT PPX - on therapeutic AC for A-fib    #Code Status - son reiterated DNR/DNI status during discussion with me, son also expressed a desire to keep patient comfortable and did not want invasive measures taken    Final recommendations pending discussion with MICU attending.    Dimple Smith MD  PGY-2 | Internal Medicine  [phone] / [phone]

## 2017-11-19 NOTE — CONSULT NOTE ADULT - SUBJECTIVE AND OBJECTIVE BOX
CHIEF COMPLAINT:    HPI:  82M h/o A-fib, CVA with residual R hemiparesis, dementia, non-verbal, s/p PEG tube, with 24-hour care who presents to the ED with SOB.    The patient's son is at bedside.  He states that for the past 2-3 days, he noticed that his father has been sneezing.  He also reports a mild cough.  The patient's son states that he has had a cold for the past few days but that otherwise, no sick contacts.  Today, the patient began to be profusely sweaty, which prompted the son to call EMS.    In the ED, patient noted to be hypoxic to 85% on RA.  Tmax 102.1F, -110.  Initial /89.  He was started on BiPAP, with subsequent decrease in his BP to 72/50.  The patient was   given clindamycin 600mg IV x1, acetaminophen 1g IV x1, duoneb x1, NS bolus 1L x1.  Brief labs: WBC 6.57, Hgb 12.9, plt 15, INR 2.57, Na 134, K 4.3, Cr 0.53, Maciel negative x1, lactate 1.4.  UA   not remarkable.  CXR with haziness RLL.    Currently, the patient's son states that the patient has improved vastly and is calm, which he was not at presentation.  He states that his father is, at baseline, not very responsive and is often sleeping.  He states that the patient appears to recognize him when his eyes are open.  The patient is bedbound, non-verbal, with 24-hour HHA at home.  He has had a progressive decline since his stroke ~8 years ago.  He also has a PEG tube in place due to dysphagia.  He states that the patient lives at home and has not been hospitalized since his last visit in January.    PAST MEDICAL & SURGICAL HISTORY:    Right Hemiparesis  H/O: CVA  Atrial Fibrillation  S/P percutaneous endoscopic gastrostomy (PEG) tube placement  No Past Surgical History      FAMILY HISTORY:  No pertinent family history in first degree relatives      SOCIAL HISTORY:  Smoking: [ ] Never Smoked [ ] Former Smoker (__ packs x ___ years) [ ] Current Smoker  (__ packs x ___ years)  Substance Use: [ ] Never Used [ ] Used ____  EtOH Use:  Marital Status: [ ] Single [ ]  [ ]  [ ]   Sexual History:   Occupation:  Recent Travel:  Country of Birth:  Advance Directives:    Allergies    penicillins (Unknown)    Intolerances        HOME MEDICATIONS:    REVIEW OF SYSTEMS:  Constitutional: [ ] fevers [ ] chills [ ] weight loss [ ] weight gain  HEENT: [ ] dry eyes [ ] eye irritation [ ] postnasal drip [ ] nasal congestion  CV: [ ] chest pain [ ] orthopnea [ ] palpitations [ ] murmur  Resp: [ ] cough [ ] shortness of breath [ ] dyspnea [ ] wheezing [ ] sputum [ ] hemoptysis  GI: [ ] nausea [ ] vomiting [ ] diarrhea [ ] constipation [ ] abd pain [ ] dysphagia   : [ ] dysuria [ ] nocturia [ ] hematuria [ ] increased urinary frequency  Musculoskeletal: [ ] back pain [ ] myalgias [ ] arthralgias [ ] fracture  Skin: [ ] rash [ ] itch  Neurological: [ ] headache [ ] dizziness [ ] syncope [ ] weakness [ ] numbness  Psychiatric: [ ] anxiety [ ] depression  Endocrine: [ ] diabetes [ ] thyroid problem  Maciel negative x1, lactate 1.4.  UA    ] bleeding problem  Allergic/Immunologic: [ ] itchy eyes [ ] nasal discharge [ ] hives [ ] angioedema  [ ] All other systems negative  [ x ] Unable to assess ROS because of pt's baseline mental status    OBJECTIVE:  ICU Vital Signs Last 24 Hrs  T(C): 38.9 (2017 21:41), Max: 38.9 (2017 21:41)  T(F): 102.1 (2017 21:41), Max: 102.1 (2017 21:41)  HR: 106 (2017 22:35) (106 - 110)  BP: 72/50 (2017 22:35) (72/50 - 144/89)  BP(mean): --  ABP: --  ABP(mean): --  RR: 18 (2017 22:35) (18 - 18)  SpO2: 100% (2017 22:35) (100% - 100%)        CAPILLARY BLOOD GLUCOSE          PHYSICAL EXAM:  General: eyes closed, resting calmly in bed  HEENT: eyes closed, MM somewhat dry  Lymph Nodes: no LAD noted in neck  Neck: soft / supple  Respiratory: breathing comfortably, no intercostal retractions, decreased BS @ R lung base but exam limited 2/2 pt effort, transmitted upper airway noises and rhonchi RUL / BRANDON  Cardiovascular: tachycardic, irregular rhythm  Abdomen: +BS, soft, NT  Extremities: no LE edema  Skin: warm, dry  Neurological: non-verbal, eyes closed, responsive to painful stimuli, pt's son states pt is at relative baseline  Psychiatry: unable to assess    LINES:     HOSPITAL MEDICATIONS:  MEDICATIONS  (STANDING):    MEDICATIONS  (PRN):      LABS:                        12.9   6.57  )-----------( 150      ( 2017 21:35 )             39.2     Hgb Trend: 12.9<--  11-19    134<L>  |  94<L>  |  14  ----------------------------<  110<H>  4.3   |  27  |  0.53    Ca    8.2<L>      2017 21:35    TPro  7.4  /  Alb  3.9  /  TBili  0.3  /  DBili  x   /  AST  19  /  ALT  11  /  AlkPhos  88      Creatinine Trend: 0.53<--  PT/INR - ( 2017 21:35 )   PT: 29.3 SEC;   INR: 2.57          PTT - ( 2017 21:35 )  PTT:41.7 SEC  Urinalysis Basic - ( 2017 21:51 )    Color: YELLOW / Appearance: CLEAR / S.019 / pH: 6.5  Gluc: NEGATIVE / Ketone: NEGATIVE  / Bili: NEGATIVE / Urobili: 2 E.U.   Blood: LARGE / Protein: 30 / Nitrite: NEGATIVE   Leuk Esterase: NEGATIVE / RBC: >50 / WBC 5-10   Sq Epi: x / Non Sq Epi: x / Bacteria: FEW        Venous Blood Gas:   @ 21:35  7.38/53/56/28/87.3  VBG Lactate: 1.4      MICROBIOLOGY:     RADIOLOGY:  [ x ] Reviewed and interpreted by me  CXR RLL haziness    EKG:

## 2017-11-19 NOTE — ED PROVIDER NOTE - MEDICAL DECISION MAKING DETAILS
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

## 2017-11-19 NOTE — ED PROVIDER NOTE - ATTENDING CONTRIBUTION TO CARE
ective Statement: 63M p/w hematuria and inability to urinate.  Pt was discharged from [facility] earlier today after a TURP on Friday.  Pt had been fine and urinating yellow urine after the surgery.  At about 7pm he started feeling unwell, some chest pain across the top part of his chest, felt lightheaded, then laid down.  at about 8pm pt started passing some blood and clots and then having difficulty urinating at all.  Not on blood thinners.  no LOC.  Had ST and echo within the last year which were normal.  Was sent home on Cipro which he took tonight.  Uro Dr Garcia  	PMHX RENETTA on CPAP, GERD  	PSHX flomax  	No T  	ALL Prilosec - hives  	VS:  unremarkable except tachycardia    	GEN - ; well appearing; A+O x3 Mild mod distress upon arrival- imprv after met by URO in ED and arshad placed  	HEAD - NC/AT     	ENT - PEERL, EOMI, mucous membranes  moist , no discharge      	NECK: Neck supple, non-tender without lymphadenopathy, no masses, no JVD  	PULM - CTA b/l,  symmetric breath sounds  	COR -  normal heart sounds    	ABD - , ND, NT, soft, no guarding, no rebound, no masses  .  Arshad draining bloody urine with clots.   	BACK - no CVA tenderness, nontender spine     	EXTREMS - no edema, no deformity, warm and well perfused    	SKIN - no rash or bruising      	NEUROLOGIC - alert, CN 2-12 intact, sensation nl, motor 5/5 RUE/LUE/RLE/LLE.      	IMP:  63M p/w hematuria with clots s/p TURP.  Uro at bedside doing brief CBI.  Pt tachycardic but has good color unlikely significant anemia - check labs incl coags.  Rx fluids.  Also having some CP likely secondary to bleeding episode, doubt true ACS.  EKG shows sinus tach , other wise normal no FILEMON,no STD, no TWI.  Would place in CDU for continue CBI, serial CE.  once 2x ce neg and ekg wnl, can reassess in AM and likely d/c.

## 2017-11-20 DIAGNOSIS — J96.01 ACUTE RESPIRATORY FAILURE WITH HYPOXIA: ICD-10-CM

## 2017-11-20 DIAGNOSIS — B34.8 OTHER VIRAL INFECTIONS OF UNSPECIFIED SITE: ICD-10-CM

## 2017-11-20 DIAGNOSIS — J18.9 PNEUMONIA, UNSPECIFIED ORGANISM: ICD-10-CM

## 2017-11-20 DIAGNOSIS — R13.10 DYSPHAGIA, UNSPECIFIED: ICD-10-CM

## 2017-11-20 DIAGNOSIS — G40.909 EPILEPSY, UNSPECIFIED, NOT INTRACTABLE, WITHOUT STATUS EPILEPTICUS: ICD-10-CM

## 2017-11-20 DIAGNOSIS — I48.91 UNSPECIFIED ATRIAL FIBRILLATION: ICD-10-CM

## 2017-11-20 DIAGNOSIS — G93.41 METABOLIC ENCEPHALOPATHY: ICD-10-CM

## 2017-11-20 DIAGNOSIS — Z29.9 ENCOUNTER FOR PROPHYLACTIC MEASURES, UNSPECIFIED: ICD-10-CM

## 2017-11-20 DIAGNOSIS — I63.9 CEREBRAL INFARCTION, UNSPECIFIED: ICD-10-CM

## 2017-11-20 DIAGNOSIS — I95.9 HYPOTENSION, UNSPECIFIED: ICD-10-CM

## 2017-11-20 DIAGNOSIS — R53.2 FUNCTIONAL QUADRIPLEGIA: ICD-10-CM

## 2017-11-20 DIAGNOSIS — Z71.89 OTHER SPECIFIED COUNSELING: ICD-10-CM

## 2017-11-20 LAB
ALBUMIN SERPL ELPH-MCNC: 3.5 G/DL — SIGNIFICANT CHANGE UP (ref 3.3–5)
ALP SERPL-CCNC: 75 U/L — SIGNIFICANT CHANGE UP (ref 40–120)
ALT FLD-CCNC: 14 U/L — SIGNIFICANT CHANGE UP (ref 4–41)
AST SERPL-CCNC: 23 U/L — SIGNIFICANT CHANGE UP (ref 4–40)
B PERT DNA SPEC QL NAA+PROBE: SIGNIFICANT CHANGE UP
BASOPHILS # BLD AUTO: 0.02 K/UL — SIGNIFICANT CHANGE UP (ref 0–0.2)
BASOPHILS NFR BLD AUTO: 0.3 % — SIGNIFICANT CHANGE UP (ref 0–2)
BILIRUB SERPL-MCNC: 0.3 MG/DL — SIGNIFICANT CHANGE UP (ref 0.2–1.2)
BUN SERPL-MCNC: 10 MG/DL — SIGNIFICANT CHANGE UP (ref 7–23)
C PNEUM DNA SPEC QL NAA+PROBE: NOT DETECTED — SIGNIFICANT CHANGE UP
CALCIUM SERPL-MCNC: 7.9 MG/DL — LOW (ref 8.4–10.5)
CHLORIDE SERPL-SCNC: 98 MMOL/L — SIGNIFICANT CHANGE UP (ref 98–107)
CO2 SERPL-SCNC: 24 MMOL/L — SIGNIFICANT CHANGE UP (ref 22–31)
CREAT SERPL-MCNC: 0.46 MG/DL — LOW (ref 0.5–1.3)
EOSINOPHIL # BLD AUTO: 0 K/UL — SIGNIFICANT CHANGE UP (ref 0–0.5)
EOSINOPHIL NFR BLD AUTO: 0 % — SIGNIFICANT CHANGE UP (ref 0–6)
FLUAV H1 2009 PAND RNA SPEC QL NAA+PROBE: NOT DETECTED — SIGNIFICANT CHANGE UP
FLUAV H1 RNA SPEC QL NAA+PROBE: NOT DETECTED — SIGNIFICANT CHANGE UP
FLUAV H3 RNA SPEC QL NAA+PROBE: NOT DETECTED — SIGNIFICANT CHANGE UP
FLUAV SUBTYP SPEC NAA+PROBE: SIGNIFICANT CHANGE UP
FLUBV RNA SPEC QL NAA+PROBE: NOT DETECTED — SIGNIFICANT CHANGE UP
GLUCOSE SERPL-MCNC: 103 MG/DL — HIGH (ref 70–99)
HADV DNA SPEC QL NAA+PROBE: NOT DETECTED — SIGNIFICANT CHANGE UP
HCOV 229E RNA SPEC QL NAA+PROBE: NOT DETECTED — SIGNIFICANT CHANGE UP
HCOV HKU1 RNA SPEC QL NAA+PROBE: NOT DETECTED — SIGNIFICANT CHANGE UP
HCOV NL63 RNA SPEC QL NAA+PROBE: NOT DETECTED — SIGNIFICANT CHANGE UP
HCOV OC43 RNA SPEC QL NAA+PROBE: NOT DETECTED — SIGNIFICANT CHANGE UP
HCT VFR BLD CALC: 39.2 % — SIGNIFICANT CHANGE UP (ref 39–50)
HGB BLD-MCNC: 12.5 G/DL — LOW (ref 13–17)
HMPV RNA SPEC QL NAA+PROBE: NOT DETECTED — SIGNIFICANT CHANGE UP
HPIV1 RNA SPEC QL NAA+PROBE: POSITIVE — HIGH
HPIV2 RNA SPEC QL NAA+PROBE: NOT DETECTED — SIGNIFICANT CHANGE UP
HPIV3 RNA SPEC QL NAA+PROBE: NOT DETECTED — SIGNIFICANT CHANGE UP
HPIV4 RNA SPEC QL NAA+PROBE: NOT DETECTED — SIGNIFICANT CHANGE UP
IMM GRANULOCYTES # BLD AUTO: 0.01 # — SIGNIFICANT CHANGE UP
IMM GRANULOCYTES NFR BLD AUTO: 0.1 % — SIGNIFICANT CHANGE UP (ref 0–1.5)
INR BLD: 3.05 — HIGH (ref 0.88–1.17)
LYMPHOCYTES # BLD AUTO: 1.05 K/UL — SIGNIFICANT CHANGE UP (ref 1–3.3)
LYMPHOCYTES # BLD AUTO: 15.7 % — SIGNIFICANT CHANGE UP (ref 13–44)
M PNEUMO DNA SPEC QL NAA+PROBE: NOT DETECTED — SIGNIFICANT CHANGE UP
MAGNESIUM SERPL-MCNC: 1.8 MG/DL — SIGNIFICANT CHANGE UP (ref 1.6–2.6)
MCHC RBC-ENTMCNC: 30.5 PG — SIGNIFICANT CHANGE UP (ref 27–34)
MCHC RBC-ENTMCNC: 31.9 % — LOW (ref 32–36)
MCV RBC AUTO: 95.6 FL — SIGNIFICANT CHANGE UP (ref 80–100)
MONOCYTES # BLD AUTO: 0.71 K/UL — SIGNIFICANT CHANGE UP (ref 0–0.9)
MONOCYTES NFR BLD AUTO: 10.6 % — SIGNIFICANT CHANGE UP (ref 2–14)
NEUTROPHILS # BLD AUTO: 4.89 K/UL — SIGNIFICANT CHANGE UP (ref 1.8–7.4)
NEUTROPHILS NFR BLD AUTO: 73.3 % — SIGNIFICANT CHANGE UP (ref 43–77)
NRBC # FLD: 0 — SIGNIFICANT CHANGE UP
PHOSPHATE SERPL-MCNC: 3.5 MG/DL — SIGNIFICANT CHANGE UP (ref 2.5–4.5)
PLATELET # BLD AUTO: 121 K/UL — LOW (ref 150–400)
PMV BLD: 12.7 FL — SIGNIFICANT CHANGE UP (ref 7–13)
POTASSIUM SERPL-MCNC: 4.7 MMOL/L — SIGNIFICANT CHANGE UP (ref 3.5–5.3)
POTASSIUM SERPL-SCNC: 4.7 MMOL/L — SIGNIFICANT CHANGE UP (ref 3.5–5.3)
PROT SERPL-MCNC: 7 G/DL — SIGNIFICANT CHANGE UP (ref 6–8.3)
PROTHROM AB SERPL-ACNC: 34.9 SEC — HIGH (ref 9.8–13.1)
RBC # BLD: 4.1 M/UL — LOW (ref 4.2–5.8)
RBC # FLD: 14.4 % — SIGNIFICANT CHANGE UP (ref 10.3–14.5)
RSV RNA SPEC QL NAA+PROBE: NOT DETECTED — SIGNIFICANT CHANGE UP
RV+EV RNA SPEC QL NAA+PROBE: NOT DETECTED — SIGNIFICANT CHANGE UP
SODIUM SERPL-SCNC: 134 MMOL/L — LOW (ref 135–145)
SPECIMEN SOURCE: SIGNIFICANT CHANGE UP
SPECIMEN SOURCE: SIGNIFICANT CHANGE UP
WBC # BLD: 6.68 K/UL — SIGNIFICANT CHANGE UP (ref 3.8–10.5)
WBC # FLD AUTO: 6.68 K/UL — SIGNIFICANT CHANGE UP (ref 3.8–10.5)

## 2017-11-20 PROCEDURE — 99223 1ST HOSP IP/OBS HIGH 75: CPT | Mod: GC

## 2017-11-20 PROCEDURE — 93010 ELECTROCARDIOGRAM REPORT: CPT

## 2017-11-20 PROCEDURE — 12345: CPT | Mod: NC

## 2017-11-20 RX ORDER — VALPROIC ACID (AS SODIUM SALT) 250 MG/5ML
250 SOLUTION, ORAL ORAL EVERY 12 HOURS
Qty: 0 | Refills: 0 | Status: DISCONTINUED | OUTPATIENT
Start: 2017-11-20 | End: 2017-11-30

## 2017-11-20 RX ORDER — SODIUM CHLORIDE 9 MG/ML
1000 INJECTION INTRAMUSCULAR; INTRAVENOUS; SUBCUTANEOUS
Qty: 0 | Refills: 0 | Status: DISCONTINUED | OUTPATIENT
Start: 2017-11-20 | End: 2017-11-20

## 2017-11-20 RX ORDER — PANTOPRAZOLE SODIUM 20 MG/1
40 TABLET, DELAYED RELEASE ORAL
Qty: 0 | Refills: 0 | Status: DISCONTINUED | OUTPATIENT
Start: 2017-11-20 | End: 2017-11-28

## 2017-11-20 RX ORDER — SODIUM CHLORIDE 9 MG/ML
1000 INJECTION INTRAMUSCULAR; INTRAVENOUS; SUBCUTANEOUS
Qty: 0 | Refills: 0 | Status: DISCONTINUED | OUTPATIENT
Start: 2017-11-20 | End: 2017-11-22

## 2017-11-20 RX ADMIN — Medication 100 MILLIGRAM(S): at 19:00

## 2017-11-20 RX ADMIN — SODIUM CHLORIDE 100 MILLILITER(S): 9 INJECTION INTRAMUSCULAR; INTRAVENOUS; SUBCUTANEOUS at 19:00

## 2017-11-20 RX ADMIN — Medication 100 MILLIGRAM(S): at 10:56

## 2017-11-20 RX ADMIN — SODIUM CHLORIDE 100 MILLILITER(S): 9 INJECTION INTRAMUSCULAR; INTRAVENOUS; SUBCUTANEOUS at 08:54

## 2017-11-20 RX ADMIN — Medication 250 MILLIGRAM(S): at 19:36

## 2017-11-20 NOTE — PROGRESS NOTE ADULT - SUBJECTIVE AND OBJECTIVE BOX
Patient is a 82y old  Male who presents with a chief complaint of 82M p/w SOB x hours (20 Nov 2017 02:52)      INTERVAL HPI/OVERNIGHT EVENTS:    MEDICATIONS  (STANDING):  clindamycin IVPB 600 milliGRAM(s) IV Intermittent every 8 hours  clindamycin IVPB      pantoprazole    Tablet 40 milliGRAM(s) Oral before breakfast  sodium chloride 0.9%. 1000 milliLiter(s) (100 mL/Hr) IV Continuous <Continuous>  valproic  acid Syrup 250 milliGRAM(s) Oral every 12 hours    MEDICATIONS  (PRN):      Allergies    penicillins (Unknown)    Intolerances        REVIEW OF SYSTEMS:  Please see interval HPI:    Vital Signs Last 24 Hrs  T(C): 37 (20 Nov 2017 09:37), Max: 38.9 (19 Nov 2017 21:41)  T(F): 98.6 (20 Nov 2017 09:37), Max: 102.1 (19 Nov 2017 21:41)  HR: 91 (20 Nov 2017 09:37) (80 - 110)  BP: 128/86 (20 Nov 2017 09:37) (72/50 - 144/89)  BP(mean): --  RR: 20 (20 Nov 2017 09:37) (18 - 22)  SpO2: 96% (20 Nov 2017 09:37) (96% - 100%)  I&O's Detail        PHYSICAL EXAM:  GENERAL:   HEAD:    EYES:   ENMT:   NECK:   NERVOUS SYSTEM:    CHEST/LUNG:   HEART:   ABDOMEN:   EXTREMITIES:    LYMPH:   SKIN:     LABS:                        12.5   6.68  )-----------( 121      ( 20 Nov 2017 06:44 )             39.2     20 Nov 2017 06:44    134    |  98     |  10     ----------------------------<  103    4.7     |  24     |  0.46     Ca    7.9        20 Nov 2017 06:44  Phos  3.5       20 Nov 2017 06:44  Mg     1.8       20 Nov 2017 06:44    TPro  7.0    /  Alb  3.5    /  TBili  0.3    /  DBili  x      /  AST  23     /  ALT  14     /  AlkPhos  75     20 Nov 2017 06:44    PT/INR - ( 19 Nov 2017 21:35 )   PT: 29.3 SEC;   INR: 2.57          PTT - ( 19 Nov 2017 21:35 )  PTT:41.7 SEC  CAPILLARY BLOOD GLUCOSE        BLOOD CULTURE    RADIOLOGY & ADDITIONAL TESTS:    Imaging Personally Reviewed:  [ ] YES     Consultant(s) Notes Reviewed:      Care Discussed with Consultants/Other Providers: Patient is a 82y old  Male who presents with a chief complaint of 82M p/w SOB x hours (20 Nov 2017 02:52)    INTERVAL HPI/OVERNIGHT EVENTS:  Non-verbal/bedbound at baseline. Per nurse, family reports that they feel patient more like his usual state.     MEDICATIONS  (STANDING):  clindamycin IVPB 600 milliGRAM(s) IV Intermittent every 8 hours  clindamycin IVPB      pantoprazole    Tablet 40 milliGRAM(s) Oral before breakfast  sodium chloride 0.9%. 1000 milliLiter(s) (100 mL/Hr) IV Continuous <Continuous>  valproic  acid Syrup 250 milliGRAM(s) Oral every 12 hours    MEDICATIONS  (PRN):      Allergies    penicillins (Unknown)    Intolerances        REVIEW OF SYSTEMS:  Please see interval HPI:    Vital Signs Last 24 Hrs  T(C): 37 (20 Nov 2017 09:37), Max: 38.9 (19 Nov 2017 21:41)  T(F): 98.6 (20 Nov 2017 09:37), Max: 102.1 (19 Nov 2017 21:41)  HR: 91 (20 Nov 2017 09:37) (80 - 110)  BP: 128/86 (20 Nov 2017 09:37) (72/50 - 144/89)  BP(mean): --  RR: 20 (20 Nov 2017 09:37) (18 - 22)  SpO2: 96% (20 Nov 2017 09:37) (96% - 100%)  I&O's Detail        PHYSICAL EXAM:  GENERAL:   HEAD:    EYES:   ENMT:   NECK:   NERVOUS SYSTEM:    CHEST/LUNG:   HEART:   ABDOMEN:   EXTREMITIES:    LYMPH:   SKIN:     LABS:                        12.5   6.68  )-----------( 121      ( 20 Nov 2017 06:44 )             39.2     20 Nov 2017 06:44    134    |  98     |  10     ----------------------------<  103    4.7     |  24     |  0.46     Ca    7.9        20 Nov 2017 06:44  Phos  3.5       20 Nov 2017 06:44  Mg     1.8       20 Nov 2017 06:44    TPro  7.0    /  Alb  3.5    /  TBili  0.3    /  DBili  x      /  AST  23     /  ALT  14     /  AlkPhos  75     20 Nov 2017 06:44    PT/INR - ( 19 Nov 2017 21:35 )   PT: 29.3 SEC;   INR: 2.57          PTT - ( 19 Nov 2017 21:35 )  PTT:41.7 SEC  CAPILLARY BLOOD GLUCOSE        BLOOD CULTURE    RADIOLOGY & ADDITIONAL TESTS:    Imaging Personally Reviewed:  [ ] YES     Consultant(s) Notes Reviewed:      Care Discussed with Consultants/Other Providers: Patient is a 82y old  Male who presents with a chief complaint of 82M p/w SOB x hours (20 Nov 2017 02:52)    INTERVAL HPI/OVERNIGHT EVENTS:  Non-verbal/bedbound at baseline. Earlier in the day, family noted patient appeared to be close to his baseline. Hypotensive to the 80s this evening. I spoke with daughter Sandra at bedside and discussed plan of care. Will give IV fluids and potentially midodrine. No aggressive measures, remains DNR/DNI.     MEDICATIONS  (STANDING):  clindamycin IVPB 600 milliGRAM(s) IV Intermittent every 8 hours  clindamycin IVPB      pantoprazole    Tablet 40 milliGRAM(s) Oral before breakfast  sodium chloride 0.9%. 1000 milliLiter(s) (100 mL/Hr) IV Continuous <Continuous>  valproic  acid Syrup 250 milliGRAM(s) Oral every 12 hours    MEDICATIONS  (PRN):    Allergies  penicillins (Unknown)  Intolerances    REVIEW OF SYSTEMS:  Please see interval HPI:    Vital Signs Last 24 Hrs  T(C): 37 (20 Nov 2017 09:37), Max: 38.9 (19 Nov 2017 21:41)  T(F): 98.6 (20 Nov 2017 09:37), Max: 102.1 (19 Nov 2017 21:41)  HR: 91 (20 Nov 2017 09:37) (80 - 110)  BP: 128/86 (20 Nov 2017 09:37) (72/50 - 144/89)  * BP 84/56 @ 15:11  BP(mean): --  RR: 20 (20 Nov 2017 09:37) (18 - 22)  SpO2: 96% (20 Nov 2017 09:37) (96% - 100%)  I&O's Detail    PHYSICAL EXAM:   GENERAL: Lying in bed, NC in place, ill appearing  HEAD: NC/AT   EYES: clear conjunctiva and sclera  ENMT: MMM  NECK: supple  NERVOUS SYSTEM: non-verbal, not moving extremities   CHEST/LUNG: Tachypneic, coarse upper airway sounds   HEART: S1S2 RRR  ABDOMEN: soft, non-tender +PEG  EXTREMITIES: no c/c/e, LE slightly cool to the touch      LABS:                        12.5   6.68  )-----------( 121      ( 20 Nov 2017 06:44 )             39.2     20 Nov 2017 06:44    134    |  98     |  10     ----------------------------<  103    4.7     |  24     |  0.46     Ca    7.9        20 Nov 2017 06:44  Phos  3.5       20 Nov 2017 06:44  Mg     1.8       20 Nov 2017 06:44    TPro  7.0    /  Alb  3.5    /  TBili  0.3    /  DBili  x      /  AST  23     /  ALT  14     /  AlkPhos  75     20 Nov 2017 06:44    PT/INR - ( 19 Nov 2017 21:35 )   PT: 29.3 SEC;   INR: 2.57     PTT - ( 19 Nov 2017 21:35 )  PTT:41.7 SEC    CAPILLARY BLOOD GLUCOSE    BLOOD CULTURE  RVP +parainfluenza 1      RADIOLOGY & ADDITIONAL TESTS:    Imaging Personally Reviewed:  [ ] YES     Consultant(s) Notes Reviewed:      Care Discussed with Consultants/Other Providers: Patient is a 82y old  Male who presents with a chief complaint of 82M p/w SOB x hours (20 Nov 2017 02:52)    INTERVAL HPI/OVERNIGHT EVENTS:  Non-verbal/bedbound at baseline. Earlier in the day, family noted patient appeared to be close to his baseline. Hypotensive to the 80s this evening. I spoke with daughter Sandra at bedside and discussed plan of care. Will give IV fluids and potentially midodrine. No aggressive measures, remains DNR/DNI.     MEDICATIONS  (STANDING):  clindamycin IVPB 600 milliGRAM(s) IV Intermittent every 8 hours  clindamycin IVPB      pantoprazole    Tablet 40 milliGRAM(s) Oral before breakfast  sodium chloride 0.9%. 1000 milliLiter(s) (100 mL/Hr) IV Continuous <Continuous>  valproic  acid Syrup 250 milliGRAM(s) Oral every 12 hours    MEDICATIONS  (PRN):    Allergies  penicillins (Unknown)  Intolerances    REVIEW OF SYSTEMS:  Please see interval HPI:    Vital Signs Last 24 Hrs  T(C): 37 (20 Nov 2017 09:37), Max: 38.9 (19 Nov 2017 21:41)  T(F): 98.6 (20 Nov 2017 09:37), Max: 102.1 (19 Nov 2017 21:41)  HR: 91 (20 Nov 2017 09:37) (80 - 110)  BP: 128/86 (20 Nov 2017 09:37) (72/50 - 144/89)  * BP 84/56 @ 15:11  BP(mean): --  RR: 20 (20 Nov 2017 09:37) (18 - 22)  SpO2: 96% (20 Nov 2017 09:37) (96% - 100%)  I&O's Detail    PHYSICAL EXAM:   GENERAL: Lying in bed, NC in place, ill appearing  HEAD: NC/AT   EYES: clear conjunctiva and sclera  ENMT: MMM  NECK: supple  NERVOUS SYSTEM: non-verbal, not moving extremities   CHEST/LUNG: Tachypneic, coarse upper airway sounds   HEART: S1S2 RRR  ABDOMEN: soft, non-tender +PEG  EXTREMITIES: no c/c/e, LE slightly cool to the touch, RUE contracture      LABS:                        12.5   6.68  )-----------( 121      ( 20 Nov 2017 06:44 )             39.2     20 Nov 2017 06:44    134    |  98     |  10     ----------------------------<  103    4.7     |  24     |  0.46     Ca    7.9        20 Nov 2017 06:44  Phos  3.5       20 Nov 2017 06:44  Mg     1.8       20 Nov 2017 06:44    TPro  7.0    /  Alb  3.5    /  TBili  0.3    /  DBili  x      /  AST  23     /  ALT  14     /  AlkPhos  75     20 Nov 2017 06:44    PT/INR - ( 19 Nov 2017 21:35 )   PT: 29.3 SEC;   INR: 2.57     PTT - ( 19 Nov 2017 21:35 )  PTT:41.7 SEC    CAPILLARY BLOOD GLUCOSE    BLOOD CULTURE  RVP +parainfluenza 1      RADIOLOGY & ADDITIONAL TESTS:    Imaging Personally Reviewed:  [ ] YES     Consultant(s) Notes Reviewed:      Care Discussed with Consultants/Other Providers:

## 2017-11-20 NOTE — PROGRESS NOTE ADULT - ASSESSMENT
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 Patient SBP this afternoon in the 80s...

## 2017-11-20 NOTE — PROGRESS NOTE ADULT - PROBLEM SELECTOR PLAN 1
- likely 2/2 parainfle - likely 2/2 parainfluenza +/- aspiration pneumonia   - c/w supplemental oxygen, can escalate to bipap if needed  - patient is DNR/DNI per GOC  - chest PT, frequent suctioning

## 2017-11-20 NOTE — PROGRESS NOTE ADULT - PROBLEM SELECTOR PLAN 6
- functional quadriplegia, with contractures, dysphagia s/p PEG  - High [id number] score, on anticoagulation for stroke risk reduction  - supportive care

## 2017-11-20 NOTE — H&P ADULT - HISTORY OF PRESENT ILLNESS
82M PMHx CVA 11 years ago with residual R hemiparesis and on seizure ppx c/b dysphagia/aspiration with PEG placed 2015, on home O2 PRN (when he looks SOB), atrial fibrillation on warfarin, minimally verbal for past 6 months, dependent for all ADLs, has 24/7 HHA, BIBEMS for sneezing, cough, breathing heavily, a/w sweating and a productive cough. EMS offered intubation in the field, was declined by son. Admission performed at 3am, son was not available at bedside, given lack of need for urgent changes in management, and goals of care being close to comfort care, discussion with family deferred until AM. History as per record review.   At baseline, pt is nonverbal due to CVAs, with contractures, on a hospital bed at home, with 3 aides round the clock. Son told EMS not to intubate when they asked.  EMS noted rhonchi bilat, sat 85% RA with an ETCO2 of 50.     In ED,   Tm 102.1, HR 80 - 110, BP: 72/50 - 144/89, RR 20, o2 100% on o2  In the ED, patient noted to be hypoxic to 85% on RA.  Tmax 102.1F, -110.  Initial /89.  He was started on BiPAP, with subsequent decrease in his BP to 72/50.  The patient was   given clindamycin 600mg IV x1, acetaminophen 1g IV x1, duoneb x1, NS bolus 1L x1.  Brief labs: WBC 6.57, Hgb 12.9, plt 15, INR 2.57, Na 134, K 4.3, Cr 0.53, Maciel negative x1, lactate 1.4.  UA   not remarkable.  CXR with haziness RLL.

## 2017-11-20 NOTE — H&P ADULT - PROBLEM SELECTOR PLAN 5
- valpro acid subtherapeutic   - discussed with pharmacy, will incr dose from 250 qD to 500 qD via PEG

## 2017-11-20 NOTE — H&P ADULT - NSHPLABSRESULTS_GEN_ALL_CORE
12.9   6.57  )-----------( 150      ( 2017 21:35 )             39.2           134<L>  |  94<L>  |  14  ----------------------------<  110<H>  4.3   |  27  |  0.53    Ca    8.2<L>      2017 21:35    TPro  7.4  /  Alb  3.9  /  TBili  0.3  /  DBili  x   /  AST  19  /  ALT  11  /  AlkPhos  88      PT/INR - ( 2017 21:35 )   PT: 29.3 SEC;   INR: 2.57      PTT - ( 2017 21:35 )  PTT:41.7 SEC    CARDIAC MARKERS ( 2017 21:35 )  x     / < 0.06 ng/mL / 73 u/L / 1.30 ng/mL / x        21:35 - VBG - pH: 7.38  | pCO2: 53    | pO2: 56    | Lactate: 1.4        Urinalysis Basic - ( 2017 21:51 )    Color: YELLOW / Appearance: CLEAR / S.019 / pH: 6.5  Gluc: NEGATIVE / Ketone: NEGATIVE  / Bili: NEGATIVE / Urobili: 2 E.U.   Blood: LARGE / Protein: 30 / Nitrite: NEGATIVE   Leuk Esterase: NEGATIVE / RBC: >50 / WBC 5-10   Sq Epi: x / Non Sq Epi: x / Bacteria: FEW

## 2017-11-20 NOTE — H&P ADULT - PROBLEM SELECTOR PLAN 1
- likely 2/2 aspiration event in setting of underlying URI  - RVP not yet performed, unlikely to change mgmt at this point  - pt DNR/DNI as per ED discussion with son  - covering anaerobes with clinda.   - s/p NS 1L x 2, started NS at 100cc/hr for 12 hours  - asp precautions  - o2 supplemention with high flow, pt on home o2, titrate to maintain sat > 92%

## 2017-11-20 NOTE — H&P ADULT - NSHPPHYSICALEXAM_GEN_ALL_CORE
Vital Signs Last 24 Hrs  T(C): 36.9 (20 Nov 2017 02:48), Max: 38.9 (19 Nov 2017 21:41)  T(F): 98.4 (20 Nov 2017 02:48), Max: 102.1 (19 Nov 2017 21:41)  HR: 80 (20 Nov 2017 02:48) (80 - 110)  BP: 98/50 (20 Nov 2017 02:48) (72/50 - 144/89)  BP(mean): --  RR: 21 (20 Nov 2017 02:48) (18 - 22)  SpO2: 100% (20 Nov 2017 02:48) (99% - 100%)    PHYSICAL EXAM:  General: obtunded, non responsive.   Eyes: EOMI, PERRLA, conjunctiva and sclera clear  Neck: Supple, No JVD  Chest/Lung: Clear to auscultation bilaterally on ant exam; No wheezes  Heart: Regular rate and rhythm; No murmurs, rubs, or gallops. Capillary refill WNL  Abdom: Soft, Nontender, Nondistended; Bowel sounds present  Extremities: No lower extremity edema.   Psych: AAOx0  Neurology: Does not withdraw to pain.   Skin: No rashes or lesions

## 2017-11-20 NOTE — H&P ADULT - PROBLEM SELECTOR PLAN 2
- mental status poor at baseline, pt able to move eyes and recognize family  - now with unresponsiveness, likely 2/2 aspiration event  - c/w clindamycin, cont to monitor

## 2017-11-20 NOTE — PROGRESS NOTE ADULT - PROBLEM SELECTOR PLAN 8
- s/p PEG  - family requesting potential replacement this admission if possible (when stabilized) given difficulty with taking patient to outpatient providers  - resume tube feeds (bolus regimen as per family)

## 2017-11-20 NOTE — PROGRESS NOTE ADULT - PROBLEM SELECTOR PLAN 5
- monitor HR, currently controlled  - INR 3.05, slightly supra therapeutic, hold coumadin tonight, monitor INR goal 2-3

## 2017-11-20 NOTE — H&P ADULT - PROBLEM SELECTOR PLAN 3
- remote hx c/b contration and dysphagia now s/p peg  - plan to start tube feeds in AM  - c/w coumadin for ppx

## 2017-11-20 NOTE — H&P ADULT - PROBLEM SELECTOR PLAN 4
- record review without mention of rate control  - pt tachycardic on admission, now HR wnl  - c/w coumadin, INR therapeutic on admission, unclear if took coumadin this am  - dose coumadin daily pending INR checks.

## 2017-11-21 LAB
BACTERIA UR CULT: SIGNIFICANT CHANGE UP
BUN SERPL-MCNC: 12 MG/DL — SIGNIFICANT CHANGE UP (ref 7–23)
CALCIUM SERPL-MCNC: 7.7 MG/DL — LOW (ref 8.4–10.5)
CHLORIDE SERPL-SCNC: 101 MMOL/L — SIGNIFICANT CHANGE UP (ref 98–107)
CO2 SERPL-SCNC: 25 MMOL/L — SIGNIFICANT CHANGE UP (ref 22–31)
CREAT SERPL-MCNC: 0.43 MG/DL — LOW (ref 0.5–1.3)
GLUCOSE SERPL-MCNC: 87 MG/DL — SIGNIFICANT CHANGE UP (ref 70–99)
HCT VFR BLD CALC: 33.7 % — LOW (ref 39–50)
HGB BLD-MCNC: 10.6 G/DL — LOW (ref 13–17)
INR BLD: 3.14 — HIGH (ref 0.88–1.17)
MCHC RBC-ENTMCNC: 29.4 PG — SIGNIFICANT CHANGE UP (ref 27–34)
MCHC RBC-ENTMCNC: 31.5 % — LOW (ref 32–36)
MCV RBC AUTO: 93.6 FL — SIGNIFICANT CHANGE UP (ref 80–100)
NRBC # FLD: 0 — SIGNIFICANT CHANGE UP
PLATELET # BLD AUTO: 117 K/UL — LOW (ref 150–400)
PMV BLD: 12.6 FL — SIGNIFICANT CHANGE UP (ref 7–13)
POTASSIUM SERPL-MCNC: 3.9 MMOL/L — SIGNIFICANT CHANGE UP (ref 3.5–5.3)
POTASSIUM SERPL-SCNC: 3.9 MMOL/L — SIGNIFICANT CHANGE UP (ref 3.5–5.3)
PROTHROM AB SERPL-ACNC: 36 SEC — HIGH (ref 9.8–13.1)
RBC # BLD: 3.6 M/UL — LOW (ref 4.2–5.8)
RBC # FLD: 14.5 % — SIGNIFICANT CHANGE UP (ref 10.3–14.5)
SODIUM SERPL-SCNC: 136 MMOL/L — SIGNIFICANT CHANGE UP (ref 135–145)
SPECIMEN SOURCE: SIGNIFICANT CHANGE UP
WBC # BLD: 5.07 K/UL — SIGNIFICANT CHANGE UP (ref 3.8–10.5)
WBC # FLD AUTO: 5.07 K/UL — SIGNIFICANT CHANGE UP (ref 3.8–10.5)

## 2017-11-21 PROCEDURE — 99233 SBSQ HOSP IP/OBS HIGH 50: CPT

## 2017-11-21 RX ADMIN — Medication 100 MILLIGRAM(S): at 12:00

## 2017-11-21 RX ADMIN — Medication 250 MILLIGRAM(S): at 18:02

## 2017-11-21 RX ADMIN — Medication 100 MILLIGRAM(S): at 18:02

## 2017-11-21 RX ADMIN — Medication 100 MILLIGRAM(S): at 21:30

## 2017-11-21 RX ADMIN — PANTOPRAZOLE SODIUM 40 MILLIGRAM(S): 20 TABLET, DELAYED RELEASE ORAL at 06:17

## 2017-11-21 RX ADMIN — Medication 250 MILLIGRAM(S): at 06:17

## 2017-11-21 RX ADMIN — SODIUM CHLORIDE 100 MILLILITER(S): 9 INJECTION INTRAMUSCULAR; INTRAVENOUS; SUBCUTANEOUS at 06:17

## 2017-11-21 RX ADMIN — Medication 100 MILLIGRAM(S): at 01:22

## 2017-11-21 NOTE — PROGRESS NOTE ADULT - SUBJECTIVE AND OBJECTIVE BOX
Patient is a 82y old  Male who presents with a chief complaint of 82M p/w SOB x hours (20 Nov 2017 02:52)      INTERVAL HPI/OVERNIGHT EVENTS:  Blood pressures little bit better this morning. Non-verbal at baseline. Asked to close eyes really tight if in any pain, kept eyes open.     MEDICATIONS  (STANDING):  clindamycin IVPB 600 milliGRAM(s) IV Intermittent every 8 hours  clindamycin IVPB      pantoprazole    Tablet 40 milliGRAM(s) Oral before breakfast  sodium chloride 0.9%. 1000 milliLiter(s) (100 mL/Hr) IV Continuous <Continuous>  valproic  acid Syrup 250 milliGRAM(s) Oral every 12 hours    MEDICATIONS  (PRN):    Allergies  penicillins (Unknown)    Intolerances    REVIEW OF SYSTEMS:  Please see interval HPI:    Vital Signs Last 24 Hrs  T(C): 37.1 (21 Nov 2017 09:53), Max: 37.6 (20 Nov 2017 15:11)  T(F): 98.7 (21 Nov 2017 09:53), Max: 99.7 (20 Nov 2017 15:11)  HR: 87 (21 Nov 2017 09:53) (81 - 90)  BP: 91/55 (21 Nov 2017 09:53) (84/56 - 117/74)  BP(mean): --  RR: 19 (21 Nov 2017 09:53) (18 - 20)  SpO2: 97% (21 Nov 2017 09:53) (90% - 97%)  I&O's Detail    20 Nov 2017 07:01  -  21 Nov 2017 07:00  --------------------------------------------------------  IN:    Jevity: 720 mL  Total IN: 720 mL    OUT:  Total OUT: 0 mL    Total NET: 720 mL    PHYSICAL EXAM:  GENERAL: Lying in bed, NC in place, chronically ill appearing  HEAD:  NC/AT  EYES: clear conjunctiva and sclera, looks/tracks provider  ENMT: MMM  NECK: supple  NERVOUS SYSTEM: non-verbal, not moving extremities   CHEST/LUNG: slightly tachypneic, anterior clear to auscluation  HEART: S1S22 RRR  ABDOMEN: + PEG (receiving feeds), soft, non-tender (asked to close eyes if hurts, keeps eyes open)  EXTREMITIES: no c/c/e, LE warmer to touch than yesterday, RUE contracture      LABS:                        10.6   5.07  )-----------( 117      ( 21 Nov 2017 05:30 )             33.7     21 Nov 2017 05:30    136    |  101    |  12     ----------------------------<  87     3.9     |  25     |  0.43     Ca    7.7        21 Nov 2017 05:30      PT/INR - ( 21 Nov 2017 05:30 )   PT: 36.0 SEC;   INR: 3.14       PTT - ( 19 Nov 2017 21:35 )  PTT:41.7 SEC  CAPILLARY BLOOD GLUCOSE    RVP +parainfluenza 1    BLOOD CULTURE  11-19 @ 22:46 --    NO ORGANISMS ISOLATED  NO ORGANISMS ISOLATED AT 24 HOURS  --    RADIOLOGY & ADDITIONAL TESTS:    Imaging Personally Reviewed:  [ ] YES     Consultant(s) Notes Reviewed:      Care Discussed with Consultants/Other Providers:

## 2017-11-21 NOTE — DIETITIAN INITIAL EVALUATION ADULT. - FACTORS AFF FOOD INTAKE
At home Pt receives bolus tube feeding (Jevity, but Pt’s spouse not sure about caloric value of per ml of tube feeding formula) via PEG; 1 can x 3 a day (at 8 am, 2 pm, 6 pm);/other (specify)

## 2017-11-21 NOTE — PROGRESS NOTE ADULT - PROBLEM SELECTOR PLAN 5
- monitor HR, currently controlled  - INR 3.14, slightly supra therapeutic, hold coumadin again, monitor INR goal 2-3

## 2017-11-21 NOTE — DIETITIAN INITIAL EVALUATION ADULT. - OTHER INFO
Pt seen for nursing consult (verbal). Pt 81 yo male NPO with Tube Feeding; bolus feeding: Jevity 1.2cal; Total volume for 24 hrs: 720 ml; 240 ml in 1 hr duration; Every 8 hrs via PEG. Per nurse, Pt appears tolerating well; no vomiting/diarrhea @ present. Of note Pt's dosing weight: 78.6 Kg (11/20). Spouse not sure about Pt's current body weight. Per spouse Pt lost weight, but unable to quantify. RDN remains available. DNR status noted.

## 2017-11-21 NOTE — PROGRESS NOTE ADULT - PROBLEM SELECTOR PLAN 1
- likely 2/2 parainfluenza +/- aspiration pneumonia   - c/w supplemental oxygen, can escalate to bipap if needed  - patient is DNR/DNI per GOC  - chest PT, frequent suctioning

## 2017-11-21 NOTE — PROGRESS NOTE ADULT - PROBLEM SELECTOR PLAN 8
- s/p PEG  - family requesting potential replacement this admission if possible (when stabilized) given difficulty with taking patient to outpatient providers  - c/w tube feeds (bolus regimen as per family), appreciate nutrition recs, consider increasing to 2 cans per bolus feed (480 ml tid)

## 2017-11-21 NOTE — DIETITIAN INITIAL EVALUATION ADULT. - DIET TYPE
Tube Feeding; bolus feeding: Jevity 1.2cal; Total volume for 24 hrs: 720 ml; 240 ml in 1 hr duration; Every 8 hrs via PEG/enteral feed

## 2017-11-21 NOTE — DIETITIAN INITIAL EVALUATION ADULT. - NS AS NUTRI INTERV ENTERAL NUTRITION
Volume/1. Suggest: Increase Tube Feeding: Jevity 1.2cal, 1 can x 2 (480 ml), bolus, via PEG; every 8 hrs (will provide ~1440 ml of formula, ~1728 Kcal, ~79.9 gm Protein in 24 hrs); Add free water flushes per MD discretion; Monitor Tube Feeding tolerance;           2. Monitor labs, weights, hydration status;

## 2017-11-21 NOTE — PROGRESS NOTE ADULT - PROBLEM SELECTOR PLAN 6
- functional quadriplegia, with contractures, dysphagia s/p PEG  - High [id number] score, on anticoagulation for stroke risk reduction  - c/w supportive care

## 2017-11-21 NOTE — PROGRESS NOTE ADULT - ASSESSMENT
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

## 2017-11-22 LAB
BUN SERPL-MCNC: 6 MG/DL — LOW (ref 7–23)
CALCIUM SERPL-MCNC: 7.7 MG/DL — LOW (ref 8.4–10.5)
CHLORIDE SERPL-SCNC: 102 MMOL/L — SIGNIFICANT CHANGE UP (ref 98–107)
CO2 SERPL-SCNC: 26 MMOL/L — SIGNIFICANT CHANGE UP (ref 22–31)
CREAT SERPL-MCNC: 0.35 MG/DL — LOW (ref 0.5–1.3)
GLUCOSE SERPL-MCNC: 107 MG/DL — HIGH (ref 70–99)
HCT VFR BLD CALC: 32.7 % — LOW (ref 39–50)
HGB BLD-MCNC: 10.6 G/DL — LOW (ref 13–17)
INR BLD: 2.81 — HIGH (ref 0.88–1.17)
MCHC RBC-ENTMCNC: 30.6 PG — SIGNIFICANT CHANGE UP (ref 27–34)
MCHC RBC-ENTMCNC: 32.4 % — SIGNIFICANT CHANGE UP (ref 32–36)
MCV RBC AUTO: 94.5 FL — SIGNIFICANT CHANGE UP (ref 80–100)
NRBC # FLD: 0 — SIGNIFICANT CHANGE UP
PLATELET # BLD AUTO: 121 K/UL — LOW (ref 150–400)
PMV BLD: 12.5 FL — SIGNIFICANT CHANGE UP (ref 7–13)
POTASSIUM SERPL-MCNC: 3.8 MMOL/L — SIGNIFICANT CHANGE UP (ref 3.5–5.3)
POTASSIUM SERPL-SCNC: 3.8 MMOL/L — SIGNIFICANT CHANGE UP (ref 3.5–5.3)
PROTHROM AB SERPL-ACNC: 32.2 SEC — HIGH (ref 9.8–13.1)
RBC # BLD: 3.46 M/UL — LOW (ref 4.2–5.8)
RBC # FLD: 14.3 % — SIGNIFICANT CHANGE UP (ref 10.3–14.5)
SODIUM SERPL-SCNC: 139 MMOL/L — SIGNIFICANT CHANGE UP (ref 135–145)
WBC # BLD: 4.2 K/UL — SIGNIFICANT CHANGE UP (ref 3.8–10.5)
WBC # FLD AUTO: 4.2 K/UL — SIGNIFICANT CHANGE UP (ref 3.8–10.5)

## 2017-11-22 PROCEDURE — 99222 1ST HOSP IP/OBS MODERATE 55: CPT | Mod: GC

## 2017-11-22 PROCEDURE — 99233 SBSQ HOSP IP/OBS HIGH 50: CPT

## 2017-11-22 RX ADMIN — Medication 100 MILLIGRAM(S): at 05:55

## 2017-11-22 RX ADMIN — SODIUM CHLORIDE 100 MILLILITER(S): 9 INJECTION INTRAMUSCULAR; INTRAVENOUS; SUBCUTANEOUS at 05:54

## 2017-11-22 RX ADMIN — Medication 250 MILLIGRAM(S): at 05:55

## 2017-11-22 RX ADMIN — Medication 250 MILLIGRAM(S): at 17:17

## 2017-11-22 RX ADMIN — SODIUM CHLORIDE 100 MILLILITER(S): 9 INJECTION INTRAMUSCULAR; INTRAVENOUS; SUBCUTANEOUS at 15:30

## 2017-11-22 RX ADMIN — Medication 450 MILLIGRAM(S): at 22:33

## 2017-11-22 RX ADMIN — PANTOPRAZOLE SODIUM 40 MILLIGRAM(S): 20 TABLET, DELAYED RELEASE ORAL at 05:55

## 2017-11-22 RX ADMIN — Medication 450 MILLIGRAM(S): at 15:29

## 2017-11-22 NOTE — SWALLOW BEDSIDE ASSESSMENT ADULT - SWALLOW EVAL: DIAGNOSIS
1. The patient demonstrated a moderate oral dysphagia for puree textures marked by reduced stripping of the bolus from the tsp, reduced labial seal resulting in anterior spillage of the bolus, and delayed bolus collection, transfer, and posterior transport. 2. The patient demonstrated a moderate-severe pharyngeal dysphagia for puree textures via tsp marked by significantly delayed pharyngeal swallow trigger with reduced hyolaryngeal elevation and reduced swallow intensity upon digital palpation resulting in throat clearing post each PO trial suggesting possible airway penetration.

## 2017-11-22 NOTE — PROGRESS NOTE ADULT - ASSESSMENT
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 Appears to be clinically improving, more alert per family.

## 2017-11-22 NOTE — SWALLOW BEDSIDE ASSESSMENT ADULT - ASR SWALLOW ASPIRATION MONITOR
pneumonia/throat clearing/upper respiratory infection/oral hygiene/change of breathing pattern/position upright (89Y)/cough/gurgly voice/fever

## 2017-11-22 NOTE — PROGRESS NOTE ADULT - PROBLEM SELECTOR PLAN 6
- functional quadriplegia, with contractures, dysphagia s/p PEG  - High [id number] score, on anticoagulation for stroke risk reduction  - c/w supportive care

## 2017-11-22 NOTE — SWALLOW BEDSIDE ASSESSMENT ADULT - MUCOSAL QUALITY
Oral cavity clear though dry, at which time this clinician provided oral care via cold water swabbing to hydrate tissue.

## 2017-11-22 NOTE — CONSULT NOTE ADULT - SUBJECTIVE AND OBJECTIVE BOX
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   GI consulted as family was concerned with the color of the tube (being black) and since the patient is unable to make it to outpatient appointments, they wanted GI to replace it here.   Denies any problems with peg tube including difficulty with tube feeds.     Allergies:  penicillins (Unknown)      Home Medications:  Patient Currently Takes Medications as of 21-Jan-2017 16:01 documented in Structured Notes  · 	docusate sodium 100 mg/25 mL oral syrup: 25 milliliter(s) by gastrostomy tube 2 times a day  · 	PriLOSEC OTC 20 mg oral delayed release tablet: 1 tab(s) by gastrostomy tube once a day  · 	valproic acid 250 mg/5 mL oral syrup: 5 milliliter(s) by gastrostomy tube once a day  · 	mirtazapine 15 mg oral tablet: 1 tab(s) by gastrostomy tube once a day (at bedtime)  · 	risperiDONE 0.5 mg oral tablet: 1 tab(s) by gastrostomy tube once a day  · 	senna 8.8 mg/5 mL oral syrup: 5 milliliter(s) by gastrostomy tube once a day (at bedtime)  · 	warfarin 6 mg oral tablet: 1 tab(s) by gastrostomy tube once a day    Hospital Medications:  clindamycin   Capsule 450 milliGRAM(s) Oral three times a day  pantoprazole    Tablet 40 milliGRAM(s) Oral before breakfast  sodium chloride 0.9%. 1000 milliLiter(s) IV Continuous <Continuous>  valproic  acid Syrup 250 milliGRAM(s) Oral every 12 hours      PMHX/PSHX:  Right Hemiparesis  H/O: CVA  Atrial Fibrillation  S/P percutaneous endoscopic gastrostomy (PEG) tube placement  No Past Surgical History      Family history:  No pertinent family history in first degree relatives      Social History: Lives at home with 24hrs of home health aids to assist with ADLs.    ROS:     unable to assess       PHYSICAL EXAM:     General: obtunded, non responsive.   Eyes: EOMI, PERRLA, conjunctiva and sclera clear  Neck: Supple, No JVD  Chest/Lung: Clear to auscultation bilaterally on ant exam; No wheezes  Heart: Regular rate and rhythm; No murmurs, rubs, or gallops. Capillary refill WNL  Abdom: Soft, Nontender, Nondistended; Bowel sounds present  Extremities: No lower extremity edema.   Psych: AAOx0  Neurology: Does not withdraw to pain.   Skin: No rashes or lesions    Vital Signs:  Vital Signs Last 24 Hrs  T(C): 36.6 (22 Nov 2017 05:00), Max: 36.8 (21 Nov 2017 17:45)  T(F): 97.8 (22 Nov 2017 05:00), Max: 98.2 (21 Nov 2017 17:45)  HR: 87 (22 Nov 2017 05:00) (82 - 87)  BP: 147/90 (22 Nov 2017 05:00) (111/66 - 147/90)  BP(mean): --  RR: 20 (22 Nov 2017 05:00) (18 - 20)  SpO2: 97% (22 Nov 2017 05:00) (96% - 98%)  Daily     Daily     LABS:                        10.6   4.20  )-----------( 121      ( 22 Nov 2017 06:20 )             32.7     11-22    139  |  102  |  6<L>  ----------------------------<  107<H>  3.8   |  26  |  0.35<L>    Ca    7.7<L>      22 Nov 2017 06:20    PT/INR - ( 22 Nov 2017 06:20 )   PT: 32.2 SEC;   INR: 2.81          Imaging:    < from: Upper Endoscopy (10.15.15 @ 13:58) >                                                                                   Findings:       The esophagus was normal.       The stomach was normal.       The examined duodenum was normal.       The patient was placed in the supine position for PEG placement. The        stomach was insufflated to appose gastric and abdominal walls. A site        was located in the body of the stomach with excellent transillumination        and manual external pressure for placement. The abdominal wall was        marked and prepped in a sterile manner. The area was anesthetized with 3        mL of 0.5% lidocaine. The trocar needle was introduced through the        abdominal wall and into the stomach under direct endoscopic view. A        snare was introduced through the endoscope and opened in the gastric        lumen. The guide wire was passed through the trocar and into the open        snare. The snare was closed around the guide wire. The endoscope and        snare were removed, pulling the wire out through the mouth. A skin        incision was made at the site of needle insertion. The externally        removable 20 Fr EndoVive Safety gastrostomy tube was lubricated. The        G-tube was tied to the guide wire and pulled through the mouth and into        the stomach. The trocar needle was removed, and the gastrostomy tube was        pulled out from the stomach through the skin. The external bumper was        attached to the gastrostomy tube, and the tube was cut to remove the        guide wire. The final position of the gastrostomy tube was confirmed by        relook endoscopy, and skin marking noted to be 3.5 cm at the external        bumper. The final tension and compression of the abdominal wall by the        PEG tube and external bumper were checked and revealed that the bumper        was loose and lightly touching the skin. The feeding tube was capped,        and the tube site cleaned and dressed.       PEG tube placement was confirmed by endoscopically reassessing the        stomach and directly visualizing successful placement of the PEG tube.        There was evidence of an intact gastrostomy with a patent G-tube present        in the gastric body. No bleeding was noted.                                                                                   Impression:          - Normal esophagus.                       - Normal stomach.            - Normal examined duodenum to the 2nd portion.                       -S/P placement of a 20fr Vicki-Otilio PEG.    < end of copied text >

## 2017-11-22 NOTE — PROGRESS NOTE ADULT - PROBLEM SELECTOR PLAN 1
- likely 2/2 parainfluenza +/- aspiration pneumonia   - c/w supplemental oxygen, currently O2 saturation in upper 90s  - patient is DNR/DNI per GOC  - c/w chest PT, frequent suctioning, supportive care  - has home O2 prn

## 2017-11-22 NOTE — PROGRESS NOTE ADULT - PROBLEM SELECTOR PLAN 8
- s/p PEG  - family requesting potential replacement this admission if possible (when stabilized) given difficulty with taking patient to outpatient providers  - PEG tube currently functioning  - appreciate GI eval, they had d/w family different options, to consider procedure as patient becomes more stable (in particular the respiratory status), to re-eval status Friday  - c/w tube feeds (bolus regimen as per family), appreciate nutrition recs, consider increasing to 2 cans per bolus feed (480 ml tid) - s/p PEG  - family requesting potential replacement this admission if possible (when stabilized) given difficulty with taking patient to outpatient providers  - PEG tube currently functioning  - appreciate GI eval, they had d/w family different options, to consider procedure as patient becomes more stable (in particular the respiratory status), to re-eval status Friday (tentatively on schedule for that time, to hold feeds Thursday night)  - c/w tube feeds (bolus regimen as per family), appreciate nutrition recs, consider increasing to 2 cans per bolus feed (480 ml tid)

## 2017-11-22 NOTE — CONSULT NOTE ADULT - ASSESSMENT
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 GI consulted as family was concerned with the color of the tube (being black) and since the patient is unable to make it to outpatient appointments, they wanted GI to replace it here.     Impression:   - Dysphagia s/p peg tube placement in 10/2015, but need replacement due to discoloration and disintegration of the tube     Plan:  - please trend CBC, CMP, INR  - hold anticoagulation (warfarin)  - will plan for replacement of the peg tube endoscopically on Friday  - please keep NPO after midnight on Thursday for procedure on Friday   - supportive care as per primary team    GI team will continue to follow.  Thank you for the consult.

## 2017-11-22 NOTE — PROGRESS NOTE ADULT - PROBLEM SELECTOR PLAN 5
- monitor HR, currently controlled  - INR 2.81, goal INR 2-3, coumadin has been held for last few days for slightly supratherapeutic INR, continue to monitor, can likely resume dosing coumadin tomorrow - monitor HR, currently controlled  - INR 2.81, goal INR 2-3, coumadin has been held for last few days for slightly supratherapeutic INR, continue to monitor, holding coumadin given possible GI procedure Friday...

## 2017-11-22 NOTE — PROGRESS NOTE ADULT - SUBJECTIVE AND OBJECTIVE BOX
Patient is a 82y old  Male who presents with a chief complaint of 82M p/w SOB x hours (20 Nov 2017 02:52)    INTERVAL HPI/OVERNIGHT EVENTS:  Non-verbal. Asked to close eyes tight if having pain, keeps eyes open. Family at bedside today, feels patient improving, they noticed much better eye contact. They also think patient looks comfortable.     MEDICATIONS  (STANDING):  clindamycin IVPB 600 milliGRAM(s) IV Intermittent every 8 hours  clindamycin IVPB      pantoprazole    Tablet 40 milliGRAM(s) Oral before breakfast  sodium chloride 0.9%. 1000 milliLiter(s) (100 mL/Hr) IV Continuous <Continuous>  valproic  acid Syrup 250 milliGRAM(s) Oral every 12 hours    MEDICATIONS  (PRN):    Allergies  penicillins (Unknown)    Intolerances    REVIEW OF SYSTEMS:  Please see interval HPI:    Vital Signs Last 24 Hrs  T(C): 36.6 (22 Nov 2017 05:00), Max: 36.8 (21 Nov 2017 17:45)  T(F): 97.8 (22 Nov 2017 05:00), Max: 98.2 (21 Nov 2017 17:45)  HR: 87 (22 Nov 2017 05:00) (82 - 87)  BP: 147/90 (22 Nov 2017 05:00) (111/66 - 147/90)  BP(mean): --  RR: 20 (22 Nov 2017 05:00) (18 - 20)  SpO2: 97% (22 Nov 2017 05:00) (96% - 98%)  I&O's Detail    21 Nov 2017 07:01  -  22 Nov 2017 07:00  --------------------------------------------------------  IN:    Jevity: 240 mL  Total IN: 240 mL    OUT:  Total OUT: 0 mL    Total NET: 240 mL    PHYSICAL EXAM:  GENERAL: Lying in bed, NC in place, chronically ill appearing  HEAD:  NC/AT  EYES: clear conjunctiva and sclera, looks/tracks provider and also family members  ENMT: MMM  NECK: supple  NERVOUS SYSTEM: non-verbal, not moving extremities   CHEST/LUNG: slightly tachypneic, coarse transmitted upper airway sounds  HEART: S1S2 RRR  ABDOMEN: + PEG (receiving feeds), soft, non-tender (asked to close eyes if hurts, keeps eyes open)  EXTREMITIES: no c/c/e, RUE contracture     LABS:                        10.6   4.20  )-----------( 121      ( 22 Nov 2017 06:20 )             32.7     22 Nov 2017 06:20    139    |  102    |  6      ----------------------------<  107    3.8     |  26     |  0.35     Ca    7.7        22 Nov 2017 06:20      PT/INR - ( 22 Nov 2017 06:20 )   PT: 32.2 SEC;   INR: 2.81            CAPILLARY BLOOD GLUCOSE    RVP +parainfluenza 1    BLOOD CULTURE  11-19 @ 22:46 --    NO ORGANISMS ISOLATED  NO ORGANISMS ISOLATED AT 48 HRS.  --    RADIOLOGY & ADDITIONAL TESTS:    Imaging Personally Reviewed:  [ ] YES     Consultant(s) Notes Reviewed:      Care Discussed with Consultants/Other Providers: GI

## 2017-11-22 NOTE — SWALLOW BEDSIDE ASSESSMENT ADULT - ASR SWALLOW LINGUAL MOBILITY
Patient demonstrated difficulty following low-level verbal and visual prompting to formally assess oral-motor function at this time.

## 2017-11-22 NOTE — SWALLOW BEDSIDE ASSESSMENT ADULT - SWALLOW EVAL: RECOMMENDED DIET
Oral nutrition/hydration is contraindicated at this time. Continue PEG as primary means of nutrition/hydration, as the patient is at an increased aspiration and nutritional risk at this time.

## 2017-11-23 LAB
BUN SERPL-MCNC: 6 MG/DL — LOW (ref 7–23)
CALCIUM SERPL-MCNC: 7.4 MG/DL — LOW (ref 8.4–10.5)
CHLORIDE SERPL-SCNC: 101 MMOL/L — SIGNIFICANT CHANGE UP (ref 98–107)
CO2 SERPL-SCNC: 27 MMOL/L — SIGNIFICANT CHANGE UP (ref 22–31)
CREAT SERPL-MCNC: 0.24 MG/DL — LOW (ref 0.5–1.3)
GLUCOSE SERPL-MCNC: 119 MG/DL — HIGH (ref 70–99)
HCT VFR BLD CALC: 32.5 % — LOW (ref 39–50)
HGB BLD-MCNC: 10.5 G/DL — LOW (ref 13–17)
INR BLD: 2.14 — HIGH (ref 0.88–1.17)
MCHC RBC-ENTMCNC: 30.6 PG — SIGNIFICANT CHANGE UP (ref 27–34)
MCHC RBC-ENTMCNC: 32.3 % — SIGNIFICANT CHANGE UP (ref 32–36)
MCV RBC AUTO: 94.8 FL — SIGNIFICANT CHANGE UP (ref 80–100)
NRBC # FLD: 0 — SIGNIFICANT CHANGE UP
PLATELET # BLD AUTO: 125 K/UL — LOW (ref 150–400)
PMV BLD: 11.9 FL — SIGNIFICANT CHANGE UP (ref 7–13)
POTASSIUM SERPL-MCNC: 3.7 MMOL/L — SIGNIFICANT CHANGE UP (ref 3.5–5.3)
POTASSIUM SERPL-SCNC: 3.7 MMOL/L — SIGNIFICANT CHANGE UP (ref 3.5–5.3)
PROTHROM AB SERPL-ACNC: 24.4 SEC — HIGH (ref 9.8–13.1)
RBC # BLD: 3.43 M/UL — LOW (ref 4.2–5.8)
RBC # FLD: 14.3 % — SIGNIFICANT CHANGE UP (ref 10.3–14.5)
SODIUM SERPL-SCNC: 138 MMOL/L — SIGNIFICANT CHANGE UP (ref 135–145)
WBC # BLD: 3.02 K/UL — LOW (ref 3.8–10.5)
WBC # FLD AUTO: 3.02 K/UL — LOW (ref 3.8–10.5)

## 2017-11-23 PROCEDURE — 99233 SBSQ HOSP IP/OBS HIGH 50: CPT

## 2017-11-23 PROCEDURE — 71010: CPT | Mod: 26

## 2017-11-23 RX ORDER — IPRATROPIUM/ALBUTEROL SULFATE 18-103MCG
3 AEROSOL WITH ADAPTER (GRAM) INHALATION EVERY 6 HOURS
Qty: 0 | Refills: 0 | Status: DISCONTINUED | OUTPATIENT
Start: 2017-11-23 | End: 2017-11-26

## 2017-11-23 RX ORDER — AZTREONAM 2 G
1000 VIAL (EA) INJECTION EVERY 8 HOURS
Qty: 0 | Refills: 0 | Status: DISCONTINUED | OUTPATIENT
Start: 2017-11-24 | End: 2017-11-30

## 2017-11-23 RX ORDER — AZTREONAM 2 G
VIAL (EA) INJECTION
Qty: 0 | Refills: 0 | Status: DISCONTINUED | OUTPATIENT
Start: 2017-11-23 | End: 2017-11-30

## 2017-11-23 RX ORDER — AZTREONAM 2 G
1000 VIAL (EA) INJECTION ONCE
Qty: 0 | Refills: 0 | Status: COMPLETED | OUTPATIENT
Start: 2017-11-23 | End: 2017-11-23

## 2017-11-23 RX ADMIN — Medication 250 MILLIGRAM(S): at 17:06

## 2017-11-23 RX ADMIN — Medication 450 MILLIGRAM(S): at 23:18

## 2017-11-23 RX ADMIN — Medication 250 MILLIGRAM(S): at 06:12

## 2017-11-23 RX ADMIN — Medication 450 MILLIGRAM(S): at 06:12

## 2017-11-23 RX ADMIN — Medication 450 MILLIGRAM(S): at 14:56

## 2017-11-23 RX ADMIN — Medication 3 MILLILITER(S): at 18:01

## 2017-11-23 RX ADMIN — Medication 50 MILLIGRAM(S): at 18:42

## 2017-11-23 RX ADMIN — PANTOPRAZOLE SODIUM 40 MILLIGRAM(S): 20 TABLET, DELAYED RELEASE ORAL at 06:13

## 2017-11-23 NOTE — PROGRESS NOTE ADULT - ASSESSMENT
Plan:  - Chest Xray urgent   - saul prn ordered  -RN instructed to oral and NT suction patient  - as per attending hold tube feeds this evening and continue with npo p midnight tonight  - D/w Dr. Flores aware and agree with the plan  - updated RN on plan Plan:  - Chest Xray urgent   - duonebs prn ordered  -RN instructed to oral and NT suction patient  - as per attending hold tube feeds this evening and continue with npo p midnight tonight  - D/w Dr. Flores aware and agree with the plan  - updated RN on plan    - added aztreonam for possible aspiration PNA coverage with leukopenia and tachypneic possible sepsis 2/2 respiratory source. - Dr. Flores aware and agrees

## 2017-11-23 NOTE — PROGRESS NOTE ADULT - ASSESSMENT
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 Appears to be clinically improving, more alert per family.      1. Acute respiratory failure with hypoxia.     likely 2/2 parainfluenza +/- aspiration pneumonia   - c/w supplemental oxygen, currently O2 saturation in upper 90s  - patient is DNR/DNI per GOC  - c/w chest PT, frequent suctioning, supportive care  - has home O2 prn.     2. Parainfluenza type 1 infection.   - c/w supportive care for parainfluenza 1, on clindamycin to cover anaerobes (pt with reported PCN allergy),till 11/24    3. Hypotension, unspecified hypotension type.stable   off iv fluids.     4. Metabolic encephalopathy.    at baseline, patient is bedbound/non-verbal but per daughter is able to move eyes and recognize family      5. Atrial fibrillation, unspecified type. stable   hold coumadin for procedure on friday   6.Cerebrovascular accident (CVA), unspecified mechanism. Plan: - functional quadriplegia, with contractures, dysphagia s/p PEG  6.Problem: Dysphagia.  Plan: - s/p PEG  - peg tube replacement on friday   keep npo midnight    7.Seizure disorder.    c/w valproic acid bid.      patient is DNR / DNI

## 2017-11-23 NOTE — PROGRESS NOTE ADULT - SUBJECTIVE AND OBJECTIVE BOX
Patient is a 82y old  Male who presents with a chief complaint of 82M p/w SOB x hours (20 Nov 2017 02:52)    Called by RN to evaluate pt. RN noted labored breathing.     Vital Signs Last 24 Hrs  T(C): 36.9 (23 Nov 2017 14:45), Max: 37 (22 Nov 2017 20:29)  T(F): 98.4 (23 Nov 2017 14:45), Max: 98.6 (22 Nov 2017 20:29)  HR: 91 (23 Nov 2017 17:06) (80 - 91)  BP: 142/85 (23 Nov 2017 17:06) (102/71 - 142/85)  BP(mean): --  RR: 24 (23 Nov 2017 17:06) (17 - 24)  SpO2: 94% (23 Nov 2017 17:06) (94% - 97%)      PT/INR - ( 23 Nov 2017 06:30 )   PT: 24.4 SEC;   INR: 2.14                              10.5   3.02  )-----------( 125      ( 23 Nov 2017 06:30 )             32.5     11-23    138  |  101  |  6<L>  ----------------------------<  119<H>  3.7   |  27  |  0.24<L>    Ca    7.4<L>      23 Nov 2017 06:30                                CAPILLARY BLOOD GLUCOSE        clindamycin Solution 15 mG/mL 450 milliGRAM(s) Oral three times a day  pantoprazole    Tablet 40 milliGRAM(s) Oral before breakfast  valproic  acid Syrup 250 milliGRAM(s) Oral every 12 hours      RADIOLOGY :    Assessment: Patient 82y with Pneumonia  Right Hemiparesis  H/O: CVA  Atrial Fibrillation  S/P percutaneous endoscopic gastrostomy (PEG) tube placement  No Past Surgical History

## 2017-11-23 NOTE — PROGRESS NOTE ADULT - SUBJECTIVE AND OBJECTIVE BOX
Patient is a 82y old  Male who presents with a chief complaint of 82M p/w SOB x hours (20 Nov 2017 02:52)    Called by RN to evaluate patient with accessory muscle use.    Vital Signs Last 24 Hrs  T(C): 36.9 (23 Nov 2017 14:45), Max: 37 (22 Nov 2017 20:29)  T(F): 98.4 (23 Nov 2017 14:45), Max: 98.6 (22 Nov 2017 20:29)  HR: 91 (23 Nov 2017 17:06) (80 - 91)  BP: 142/85 (23 Nov 2017 17:06) (102/71 - 142/85)  BP(mean): --  RR: 24 (23 Nov 2017 17:06) (17 - 24)  SpO2: 94% (23 Nov 2017 17:06) (94% - 97%)      PT/INR - ( 23 Nov 2017 06:30 )   PT: 24.4 SEC;   INR: 2.14                    10.5   3.02  )-----------( 125      ( 23 Nov 2017 06:30 )             32.5     11-23    138  |  101  |  6<L>  ----------------------------<  119<H>  3.7   |  27  |  0.24<L>    Ca    7.4<L>      23 Nov 2017 06:30                                CAPILLARY BLOOD GLUCOSE        ALBUTerol/ipratropium for Nebulization 3 milliLiter(s) Nebulizer every 6 hours PRN  clindamycin Solution 15 mG/mL 450 milliGRAM(s) Oral three times a day  pantoprazole    Tablet 40 milliGRAM(s) Oral before breakfast  valproic  acid Syrup 250 milliGRAM(s) Oral every 12 hours      RADIOLOGY :    Assessment: Patient 82y with Pneumonia  Right Hemiparesis  H/O: CVA  Atrial Fibrillation  S/P percutaneous endoscopic gastrostomy (PEG) tube placement  No Past Surgical History

## 2017-11-23 NOTE — PROGRESS NOTE ADULT - ADDITIONAL PE
VS stable, afebrile, oxygenating well on 2L nasal cannula oxygen supplementation as he uses chronically.

## 2017-11-23 NOTE — PROGRESS NOTE ADULT - ASSESSMENT
Plan:  - CXR potable STAT  - instructed RN to suction patient orally and NT suction needed  - D/w Dr. Hurd aware and agree with the plan

## 2017-11-23 NOTE — PROGRESS NOTE ADULT - ADDITIONAL PE
VS stable, afebrile, oxygenating well on nasal cannula 2L oxygen supplementation as patient uses chronically.

## 2017-11-23 NOTE — PROGRESS NOTE ADULT - SUBJECTIVE AND OBJECTIVE BOX
Patient is a 82y old  Male who presents with a chief complaint of 82M p/w SOB x hours (20 Nov 2017 02:52)    patient seen and examine at bed side   no acute issue  non verbal     MEDICATIONS  (STANDING):  clindamycin IVPB 600 milliGRAM(s) IV Intermittent every 8 hours  clindamycin IVPB      pantoprazole    Tablet 40 milliGRAM(s) Oral before breakfast  sodium chloride 0.9%. 1000 milliLiter(s) (100 mL/Hr) IV Continuous <Continuous>  valproic  acid Syrup 250 milliGRAM(s) Oral every 12 hours    Vital Signs Last 24 Hrs  T(C): 36.4 (23 Nov 2017 04:57), Max: 37.2 (22 Nov 2017 14:28)  T(F): 97.6 (23 Nov 2017 04:57), Max: 98.9 (22 Nov 2017 14:28)  HR: 80 (23 Nov 2017 04:57) (80 - 86)  BP: 102/71 (23 Nov 2017 04:57) (102/71 - 141/94)  BP(mean): --  RR: 17 (23 Nov 2017 04:57) (17 - 20)  SpO2: 97% (23 Nov 2017 04:57) (95% - 97%)    	  PHYSICAL EXAM:  GENERAL: Lying in bed, NC in place, chronically ill appearing  HEAD:  NC/AT  EYES: clear conjunctiva and sclera, looks/tracks provider and also family members  ENMT: MMM  NECK: supple  NERVOUS SYSTEM: non-verbal, not moving extremities   CHEST/LUNG: slightly tachypneic, coarse transmitted upper airway sounds  HEART: S1S2 RRR  ABDOMEN: + PEG (receiving feeds), soft, non-tender (asked to close eyes if hurts, keeps eyes open)  EXTREMITIES: no c/c/e, RUE contracture     LABS:                                         10.5   3.02  )-----------( 125      ( 23 Nov 2017 06:30 )             32.5       11-23    138  |  101  |  6<L>  ----------------------------<  119<H>  3.7   |  27  |  0.24<L>    Ca    7.4<L>      23 Nov 2017 06:30           PT/INR - ( 23 Nov 2017 06:30 )   PT: 24.4 SEC;   INR: 2.14              RVP +parainfluenza 1    BLOOD CULTURE  11-19 @ 22:46 --    NO ORGANISMS ISOLATED  NO ORGANISMS ISOLATED AT 48 HRS.  --    RADIOLOGY & ADDITIONAL TESTS:    Imaging Personally Reviewed:  [ ] YES     Consultant(s) Notes Reviewed:      Care Discussed with Consultants/Other Providers: GI

## 2017-11-24 LAB
BACTERIA BLD CULT: SIGNIFICANT CHANGE UP
BACTERIA BLD CULT: SIGNIFICANT CHANGE UP
BUN SERPL-MCNC: 6 MG/DL — LOW (ref 7–23)
CALCIUM SERPL-MCNC: 7.9 MG/DL — LOW (ref 8.4–10.5)
CHLORIDE SERPL-SCNC: 103 MMOL/L — SIGNIFICANT CHANGE UP (ref 98–107)
CO2 SERPL-SCNC: 29 MMOL/L — SIGNIFICANT CHANGE UP (ref 22–31)
CREAT SERPL-MCNC: 0.3 MG/DL — LOW (ref 0.5–1.3)
GLUCOSE SERPL-MCNC: 87 MG/DL — SIGNIFICANT CHANGE UP (ref 70–99)
HCT VFR BLD CALC: 32.9 % — LOW (ref 39–50)
HGB BLD-MCNC: 10.6 G/DL — LOW (ref 13–17)
INR BLD: 1.57 — HIGH (ref 0.88–1.17)
MCHC RBC-ENTMCNC: 30.5 PG — SIGNIFICANT CHANGE UP (ref 27–34)
MCHC RBC-ENTMCNC: 32.2 % — SIGNIFICANT CHANGE UP (ref 32–36)
MCV RBC AUTO: 94.8 FL — SIGNIFICANT CHANGE UP (ref 80–100)
NRBC # FLD: 0 — SIGNIFICANT CHANGE UP
PLATELET # BLD AUTO: 122 K/UL — LOW (ref 150–400)
PMV BLD: 11.5 FL — SIGNIFICANT CHANGE UP (ref 7–13)
POTASSIUM SERPL-MCNC: 4 MMOL/L — SIGNIFICANT CHANGE UP (ref 3.5–5.3)
POTASSIUM SERPL-SCNC: 4 MMOL/L — SIGNIFICANT CHANGE UP (ref 3.5–5.3)
PROTHROM AB SERPL-ACNC: 17.8 SEC — HIGH (ref 9.8–13.1)
RBC # BLD: 3.47 M/UL — LOW (ref 4.2–5.8)
RBC # FLD: 14.1 % — SIGNIFICANT CHANGE UP (ref 10.3–14.5)
SODIUM SERPL-SCNC: 141 MMOL/L — SIGNIFICANT CHANGE UP (ref 135–145)
WBC # BLD: 3.27 K/UL — LOW (ref 3.8–10.5)
WBC # FLD AUTO: 3.27 K/UL — LOW (ref 3.8–10.5)

## 2017-11-24 PROCEDURE — 99232 SBSQ HOSP IP/OBS MODERATE 35: CPT | Mod: GC

## 2017-11-24 PROCEDURE — 99233 SBSQ HOSP IP/OBS HIGH 50: CPT

## 2017-11-24 RX ADMIN — Medication 250 MILLIGRAM(S): at 06:19

## 2017-11-24 RX ADMIN — Medication 50 MILLIGRAM(S): at 14:58

## 2017-11-24 RX ADMIN — Medication 3 MILLILITER(S): at 17:52

## 2017-11-24 RX ADMIN — Medication 450 MILLIGRAM(S): at 06:19

## 2017-11-24 RX ADMIN — Medication 50 MILLIGRAM(S): at 23:28

## 2017-11-24 RX ADMIN — Medication 450 MILLIGRAM(S): at 23:29

## 2017-11-24 RX ADMIN — PANTOPRAZOLE SODIUM 40 MILLIGRAM(S): 20 TABLET, DELAYED RELEASE ORAL at 06:20

## 2017-11-24 RX ADMIN — Medication 450 MILLIGRAM(S): at 14:58

## 2017-11-24 RX ADMIN — Medication 250 MILLIGRAM(S): at 17:22

## 2017-11-24 RX ADMIN — Medication 50 MILLIGRAM(S): at 06:29

## 2017-11-24 NOTE — PROGRESS NOTE ADULT - PROBLEM SELECTOR PLAN 6
- functional quadriplegia, with contractures, dysphagia s/p PEG  - High [id number] score, on anticoagulation for stroke risk reduction  - c/w supportive care

## 2017-11-24 NOTE — PROGRESS NOTE ADULT - PROBLEM SELECTOR PLAN 1
- likely 2/2 parainfluenza +/- aspiration pneumonia   - c/w supplemental oxygen as needed  - patient is DNR/DNI per GOC  - c/w chest PT, frequent suctioning, supportive care  - has home O2 prn

## 2017-11-24 NOTE — PROGRESS NOTE ADULT - ASSESSMENT
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 GI consulted as family was concerned with the color of the tube (being black) and since the patient is unable to make it to outpatient appointments, they wanted GI to replace it here.     Impression:   - Dysphagia s/p peg tube placement in 10/2015, but need replacement due to discoloration and disintegration of the tube   - Acute hypoxic resp failure 2/2 parainfluenza and possible superadded aspiration pneumonia     Plan:  - please trend CBC, CMP, INR  - hold anticoagulation (warfarin)  - will plan for replacement of the peg tube endoscopically on Monday if respiratory status improves, will hold off on doing the procedure today as it is an elective procedure and patient is high risk given resp status   - please keep NPO after midnight on Sunday for procedure on Monday  - supportive care as per primary team    GI team will continue to follow.  Thank you for the consult. 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 GI consulted as family was concerned with the color of the tube (being black) and since the patient is unable to make it to outpatient appointments, they wanted GI to replace it here.     Impression:   - Dysphagia s/p peg tube placement in 10/2015, but need replacement due to discoloration and disintegration of the tube   - Acute hypoxic resp failure 2/2 parainfluenza and possible superadded aspiration pneumonia, on aztreonam and clindamycin     Plan:  - please trend CBC, CMP, INR  - hold anticoagulation (warfarin) for the procedure, can switch to other mode of anticoagulation until then   - will plan for replacement of the peg tube endoscopically on Monday if respiratory status improves, will hold off on doing the procedure today as it is an elective procedure and patient is high risk given resp status   - please keep NPO after midnight on Sunday for procedure on Monday  - supportive care as per primary team    GI team will continue to follow.  Thank you for the consult. 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 GI consulted as family was concerned with the color of the tube (being black) and since the patient is unable to make it to outpatient appointments, they wanted GI to replace it here.     Impression:   - Dysphagia s/p peg tube placement in 10/2015, but need replacement due to discoloration and disintegration of the tube   - Acute hypoxic resp failure 2/2 parainfluenza and possible superadded aspiration pneumonia, on aztreonam and clindamycin     Plan:  - please trend CBC, CMP, INR  - hold anticoagulation (warfarin) for the procedure, can switch to other mode of anticoagulation until then   - will plan for replacement of the peg tube endoscopically if respiratory status improves, will hold off on doing the procedure today as it is an elective procedure and patient is high risk given resp status   - please call us back when the patient is cleared from respiratory standpoint.  - supportive care as per primary team    GI team will signoff.  Thank you for the consult.

## 2017-11-24 NOTE — PROGRESS NOTE ADULT - ASSESSMENT
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 Blood pressure better, but with tachypnea and accessory muscle use yesterday, aztreonam added to antibiotic regimen.

## 2017-11-24 NOTE — PROGRESS NOTE ADULT - SUBJECTIVE AND OBJECTIVE BOX
Chief Complaint:  Patient is a 82y old  Male who presents with a chief complaint of 82M p/w SOB x hours (20 Nov 2017 02:52)      Interval Events:   Patient seen and examined.  He continues to be tachypneic and SOB.   Yest, he was using accessory muscles of respiration and aztreonam was added for possible aspiration PNA coverage with leukopenia and tachypneic     Allergies:  penicillins (Unknown)      Hospital Medications:  ALBUTerol/ipratropium for Nebulization 3 milliLiter(s) Nebulizer every 6 hours PRN  aztreonam  IVPB 1000 milliGRAM(s) IV Intermittent every 8 hours  aztreonam  IVPB      clindamycin Solution 15 mG/mL 450 milliGRAM(s) Oral three times a day  pantoprazole    Tablet 40 milliGRAM(s) Oral before breakfast  valproic  acid Syrup 250 milliGRAM(s) Oral every 12 hours      PMHX/PSHX:  Right Hemiparesis  H/O: CVA  Atrial Fibrillation  S/P percutaneous endoscopic gastrostomy (PEG) tube placement  No Past Surgical History      Family history:  No pertinent family history in first degree relatives      ROS:     General:  No wt loss, fevers, chills, night sweats, fatigue,   Eyes:  Good vision, no reported pain  ENT:  No sore throat, pain, runny nose, dysphagia  CV:  No pain, palpitations, hypo/hypertension  Resp:  No dyspnea, cough, tachypnea, wheezing  GI:  See HPI  :  No pain, bleeding, incontinence, nocturia  Muscle:  No pain, weakness  Neuro:  No weakness, tingling, memory problems  Psych:  No fatigue, insomnia, mood problems, depression  Endocrine:  No polyuria, polydipsia, cold/heat intolerance  Heme:  No petechiae, ecchymosis, easy bruisability  Skin:  No rash, edema      PHYSICAL EXAM:     GENERAL:  Appears stated age, well-groomed, well-nourished, no distress  HEENT:  NC/AT,  conjunctivae clear, sclera -anicteric  CHEST:  Full & symmetric excursion, no increased effort, breath sounds clear  HEART:  Regular rhythm, S1, S2, no murmur/rub/S3/S4,  no edema  ABDOMEN:  Soft, non-tender, non-distended, normoactive bowel sounds,  no masses ,no hepato-splenomegaly,   EXTREMITIES:  no cyanosis,clubbing or edema  SKIN:  No rash/erythema/ecchymoses/petechiae/wounds/abscess/warm/dry  NEURO:  Alert, oriented    Vital Signs:  Vital Signs Last 24 Hrs  T(C): 36.7 (24 Nov 2017 06:18), Max: 37.2 (23 Nov 2017 20:38)  T(F): 98 (24 Nov 2017 06:18), Max: 98.9 (23 Nov 2017 20:38)  HR: 85 (24 Nov 2017 06:18) (85 - 92)  BP: 106/65 (24 Nov 2017 06:18) (105/76 - 142/85)  BP(mean): --  RR: 20 (24 Nov 2017 06:18) (18 - 24)  SpO2: 97% (24 Nov 2017 06:18) (94% - 97%)  Daily     Daily     LABS:                        10.6   3.27  )-----------( 122      ( 24 Nov 2017 06:29 )             32.9     11-24    141  |  103  |  6<L>  ----------------------------<  87  4.0   |  29  |  0.30<L>    Ca    7.9<L>      24 Nov 2017 06:29        PT/INR - ( 24 Nov 2017 06:29 )   PT: 17.8 SEC;   INR: 1.57                  Imaging: Chief Complaint:  Patient is a 82y old  Male who presents with a chief complaint of 82M p/w SOB x hours (20 Nov 2017 02:52)      Interval Events:   Patient seen and examined.  He continues to be tachypneic and SOB.   Yest, he was using accessory muscles of respiration and aztreonam was added for possible aspiration PNA coverage with leukopenia and tachypneic     Allergies:  penicillins (Unknown)      Hospital Medications:  ALBUTerol/ipratropium for Nebulization 3 milliLiter(s) Nebulizer every 6 hours PRN  aztreonam  IVPB 1000 milliGRAM(s) IV Intermittent every 8 hours  aztreonam  IVPB      clindamycin Solution 15 mG/mL 450 milliGRAM(s) Oral three times a day  pantoprazole    Tablet 40 milliGRAM(s) Oral before breakfast  valproic  acid Syrup 250 milliGRAM(s) Oral every 12 hours      PMHX/PSHX:  Right Hemiparesis  H/O: CVA  Atrial Fibrillation  S/P percutaneous endoscopic gastrostomy (PEG) tube placement  No Past Surgical History      Family history:  No pertinent family history in first degree relatives      ROS:     General:  No wt loss, fevers, chills, night sweats, fatigue,   Eyes:  Good vision, no reported pain  ENT:  No sore throat, pain, runny nose, dysphagia  CV:  No pain, palpitations, hypo/hypertension  Resp:  No dyspnea, cough, tachypnea, wheezing  GI:  See HPI  :  No pain, bleeding, incontinence, nocturia  Muscle:  No pain, weakness  Neuro:  No weakness, tingling, memory problems  Psych:  No fatigue, insomnia, mood problems, depression  Endocrine:  No polyuria, polydipsia, cold/heat intolerance  Heme:  No petechiae, ecchymosis, easy bruisability  Skin:  No rash, edema      PHYSICAL EXAM:     GENERAL:  Appears stated age, well-groomed, well-nourished, no distress  HEENT:  NC/AT,  conjunctivae clear, sclera -anicteric  CHEST:  tachypenic   HEART:  Regular rhythm, S1, S2  ABDOMEN:  Soft, non-tender, non-distended, normoactive bowel sounds, peg tube in place   EXTREMITIES:  no cyanosis,clubbing or edema  SKIN:  No rash/erythema/ecchymoses/petechiae/wounds/abscess/warm/dry  NEURO:  Alert, oriented    Vital Signs:  Vital Signs Last 24 Hrs  T(C): 36.7 (24 Nov 2017 06:18), Max: 37.2 (23 Nov 2017 20:38)  T(F): 98 (24 Nov 2017 06:18), Max: 98.9 (23 Nov 2017 20:38)  HR: 85 (24 Nov 2017 06:18) (85 - 92)  BP: 106/65 (24 Nov 2017 06:18) (105/76 - 142/85)  BP(mean): --  RR: 20 (24 Nov 2017 06:18) (18 - 24)  SpO2: 97% (24 Nov 2017 06:18) (94% - 97%)  Daily     Daily     LABS:                        10.6   3.27  )-----------( 122      ( 24 Nov 2017 06:29 )             32.9     11-24    141  |  103  |  6<L>  ----------------------------<  87  4.0   |  29  |  0.30<L>    Ca    7.9<L>      24 Nov 2017 06:29        PT/INR - ( 24 Nov 2017 06:29 )   PT: 17.8 SEC;   INR: 1.57                  Imaging:

## 2017-11-24 NOTE — PROGRESS NOTE ADULT - PROBLEM SELECTOR PLAN 5
- monitor HR, currently controlled  - INR 1.57, goal normally 2-3, coumadin on hold given possible PEG replacement by GI (to be re-evaluated on Monday)  - elevated [id number] (age, prior CVA ~ 4pts), to d/w family bridging (risks/benefits)

## 2017-11-24 NOTE — PROGRESS NOTE ADULT - PROBLEM SELECTOR PLAN 10
- DNR/DNI  - no invasive/aggressive measures  - offered chaplaincy, emotional support provided to family, would be amenable to 

## 2017-11-24 NOTE — PROGRESS NOTE ADULT - PROBLEM SELECTOR PROBLEM 10
Goals of care, counseling/discussion

## 2017-11-24 NOTE — PROGRESS NOTE ADULT - SUBJECTIVE AND OBJECTIVE BOX
Patient is a 82y old  Male who presents with a chief complaint of 82M p/w SOB x hours (20 Nov 2017 02:52)    INTERVAL HPI/OVERNIGHT EVENTS:  Tachypneic yesterday, feeds held, aztreonam was added to antibiotic regimen. GI to re-eval for PEG replacement on Monday, holding off on procedure for today.     MEDICATIONS  (STANDING):  aztreonam  IVPB 1000 milliGRAM(s) IV Intermittent every 8 hours  aztreonam  IVPB      clindamycin Solution 15 mG/mL 450 milliGRAM(s) Oral three times a day  pantoprazole    Tablet 40 milliGRAM(s) Oral before breakfast  valproic  acid Syrup 250 milliGRAM(s) Oral every 12 hours    MEDICATIONS  (PRN):  ALBUTerol/ipratropium for Nebulization 3 milliLiter(s) Nebulizer every 6 hours PRN Shortness of Breath and/or Wheezing    Allergies  penicillins (Unknown)    Intolerances    REVIEW OF SYSTEMS:  Please see interval HPI:    Vital Signs Last 24 Hrs  T(C): 36.6 (24 Nov 2017 13:37), Max: 37.2 (23 Nov 2017 20:38)  T(F): 97.9 (24 Nov 2017 13:37), Max: 98.9 (23 Nov 2017 20:38)  HR: 75 (24 Nov 2017 13:37) (75 - 92)  BP: 137/75 (24 Nov 2017 13:37) (105/76 - 142/85)  BP(mean): --  RR: 20 (24 Nov 2017 13:37) (18 - 24)  SpO2: 100% (24 Nov 2017 13:37) (94% - 100%)  I&O's Detail    PHYSICAL EXAM:  GENERAL: NAD, lying in bed  HEAD:  NC/AT  NECK: supple  NERVOUS SYSTEM: non-verbal, not moving extremities  CHEST/LUNG: NC in place, coarse upper airway sounds, in no respiratory distress  HEART: S1S2 RRR  ABDOMEN: soft, non-tender, +PEG  EXTREMITIES:  no c/c/e, RUE contracture      LABS:                        10.6   3.27  )-----------( 122      ( 24 Nov 2017 06:29 )             32.9     24 Nov 2017 06:29    141    |  103    |  6      ----------------------------<  87     4.0     |  29     |  0.30     Ca    7.9        24 Nov 2017 06:29      PT/INR - ( 24 Nov 2017 06:29 )   PT: 17.8 SEC;   INR: 1.57       CAPILLARY BLOOD GLUCOSE  84 (24 Nov 2017 13:05)    BLOOD CULTURE    RADIOLOGY & ADDITIONAL TESTS:    Imaging Personally Reviewed:  [ x] YES   CXR: small right midlung atelectasis    Consultant(s) Notes Reviewed:  GI    Care Discussed with Consultants/Other Providers:

## 2017-11-25 LAB
BASOPHILS # BLD AUTO: 0.01 K/UL — SIGNIFICANT CHANGE UP (ref 0–0.2)
BASOPHILS NFR BLD AUTO: 0.2 % — SIGNIFICANT CHANGE UP (ref 0–2)
BUN SERPL-MCNC: 7 MG/DL — SIGNIFICANT CHANGE UP (ref 7–23)
CALCIUM SERPL-MCNC: 8.1 MG/DL — LOW (ref 8.4–10.5)
CHLORIDE SERPL-SCNC: 101 MMOL/L — SIGNIFICANT CHANGE UP (ref 98–107)
CO2 SERPL-SCNC: 26 MMOL/L — SIGNIFICANT CHANGE UP (ref 22–31)
CREAT SERPL-MCNC: 0.42 MG/DL — LOW (ref 0.5–1.3)
EOSINOPHIL # BLD AUTO: 0.02 K/UL — SIGNIFICANT CHANGE UP (ref 0–0.5)
EOSINOPHIL NFR BLD AUTO: 0.5 % — SIGNIFICANT CHANGE UP (ref 0–6)
GLUCOSE SERPL-MCNC: 85 MG/DL — SIGNIFICANT CHANGE UP (ref 70–99)
HCT VFR BLD CALC: 33.8 % — LOW (ref 39–50)
HGB BLD-MCNC: 10.8 G/DL — LOW (ref 13–17)
IMM GRANULOCYTES # BLD AUTO: 0.01 # — SIGNIFICANT CHANGE UP
IMM GRANULOCYTES NFR BLD AUTO: 0.2 % — SIGNIFICANT CHANGE UP (ref 0–1.5)
INR BLD: 1.65 — HIGH (ref 0.88–1.17)
LYMPHOCYTES # BLD AUTO: 0.94 K/UL — LOW (ref 1–3.3)
LYMPHOCYTES # BLD AUTO: 22.4 % — SIGNIFICANT CHANGE UP (ref 13–44)
MCHC RBC-ENTMCNC: 29.7 PG — SIGNIFICANT CHANGE UP (ref 27–34)
MCHC RBC-ENTMCNC: 32 % — SIGNIFICANT CHANGE UP (ref 32–36)
MCV RBC AUTO: 92.9 FL — SIGNIFICANT CHANGE UP (ref 80–100)
MONOCYTES # BLD AUTO: 0.43 K/UL — SIGNIFICANT CHANGE UP (ref 0–0.9)
MONOCYTES NFR BLD AUTO: 10.3 % — SIGNIFICANT CHANGE UP (ref 2–14)
NEUTROPHILS # BLD AUTO: 2.78 K/UL — SIGNIFICANT CHANGE UP (ref 1.8–7.4)
NEUTROPHILS NFR BLD AUTO: 66.4 % — SIGNIFICANT CHANGE UP (ref 43–77)
NRBC # FLD: 0 — SIGNIFICANT CHANGE UP
PLATELET # BLD AUTO: 164 K/UL — SIGNIFICANT CHANGE UP (ref 150–400)
PMV BLD: 11.3 FL — SIGNIFICANT CHANGE UP (ref 7–13)
POTASSIUM SERPL-MCNC: 5.3 MMOL/L — SIGNIFICANT CHANGE UP (ref 3.5–5.3)
POTASSIUM SERPL-SCNC: 5.3 MMOL/L — SIGNIFICANT CHANGE UP (ref 3.5–5.3)
PROTHROM AB SERPL-ACNC: 18.7 SEC — HIGH (ref 9.8–13.1)
RBC # BLD: 3.64 M/UL — LOW (ref 4.2–5.8)
RBC # FLD: 13.9 % — SIGNIFICANT CHANGE UP (ref 10.3–14.5)
SODIUM SERPL-SCNC: 138 MMOL/L — SIGNIFICANT CHANGE UP (ref 135–145)
WBC # BLD: 4.19 K/UL — SIGNIFICANT CHANGE UP (ref 3.8–10.5)
WBC # FLD AUTO: 4.19 K/UL — SIGNIFICANT CHANGE UP (ref 3.8–10.5)

## 2017-11-25 PROCEDURE — 99233 SBSQ HOSP IP/OBS HIGH 50: CPT

## 2017-11-25 RX ADMIN — Medication 250 MILLIGRAM(S): at 06:20

## 2017-11-25 RX ADMIN — Medication 50 MILLIGRAM(S): at 21:08

## 2017-11-25 RX ADMIN — Medication 450 MILLIGRAM(S): at 06:20

## 2017-11-25 RX ADMIN — PANTOPRAZOLE SODIUM 40 MILLIGRAM(S): 20 TABLET, DELAYED RELEASE ORAL at 06:19

## 2017-11-25 RX ADMIN — Medication 50 MILLIGRAM(S): at 06:19

## 2017-11-25 RX ADMIN — Medication 50 MILLIGRAM(S): at 13:00

## 2017-11-25 RX ADMIN — Medication 250 MILLIGRAM(S): at 18:21

## 2017-11-25 NOTE — PROGRESS NOTE ADULT - SUBJECTIVE AND OBJECTIVE BOX
82y old  Male who presents with a chief complaint of 82M p/w SOB x hours (20 Nov 2017 02:52)    patient seen and examine at bed side   comfortable   no fever , peg was cancelled as he was Tachypenic, peg replacement on Monday      MEDICATIONS  (STANDING):  aztreonam  IVPB 1000 milliGRAM(s) IV Intermittent every 8 hours  aztreonam  IVPB      pantoprazole    Tablet 40 milliGRAM(s) Oral before breakfast  valproic  acid Syrup 250 milliGRAM(s) Oral every 12 hours    MEDICATIONS  (PRN):  ALBUTerol/ipratropium for Nebulization 3 milliLiter(s) Nebulizer every 6 hours PRN Shortness of Breath and/or Wheezing        Vital Signs Last 24 Hrs  T(C): 36.9 (25 Nov 2017 12:51), Max: 37.2 (24 Nov 2017 17:20)  T(F): 98.4 (25 Nov 2017 12:51), Max: 98.9 (24 Nov 2017 17:20)  HR: 82 (25 Nov 2017 12:51) (78 - 91)  BP: 113/76 (25 Nov 2017 12:51) (113/76 - 135/77)  BP(mean): --  RR: 22 (25 Nov 2017 12:51) (22 - 25)  SpO2: 97% (25 Nov 2017 12:51) (96% - 97%)    PHYSICAL EXAM:  GENERAL: NAD, lying in bed  HEAD:  NC/AT  NECK: supple  NERVOUS SYSTEM: non-verbal, not moving extremities  PT/INR - ( 25 Nov 2017 06:49 )   PT: 18.7 SEC;   INR: 1.65          CHEST/LUNG: NC in place, coarse upper airway sounds, in no respiratory distress  HEART: S1S2 RRR  ABDOMEN: soft, non-tender, +PEG  EXTREMITIES:  no c/c/e, RUE contracture      LABS:                                           10.8   4.19  )-----------( 164      ( 25 Nov 2017 12:45 )             33.8       11-25    138  |  101  |  7   ----------------------------<  85  5.3   |  26  |  0.42<L>    Ca    8.1<L>      25 Nov 2017 06:49        CAPILLARY BLOOD GLUCOSE  84 (24 Nov 2017 13:05)    BLOOD CULTURE    RADIOLOGY & ADDITIONAL TESTS:    Imaging Personally Reviewed:  [ x] YES   CXR: small right midlung atelectasis    Consultant(s) Notes Reviewed:  GI    Care Discussed with Consultants/Other Providers:

## 2017-11-25 NOTE — PROGRESS NOTE ADULT - ASSESSMENT
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 Appears to be clinically improving, more alert per family.      1. Acute respiratory failure with hypoxia.     likely 2/2 parainfluenza +/- aspiration pneumonia   - c/w supplemental oxygen, currently O2 saturation in upper 90s  - patient is DNR/DNI per GOC  - c/w chest PT, frequent suctioning, supportive care  - has home O2 prn.   New infiltrate   Azectram added for possible aspiration pneumonia   f/u cultures       2. Parainfluenza type 1 infection.   - c/w supportive care for parainfluenza 1, on clindamycin to cover anaerobes (pt with reported PCN allergy),till 11/24    3. Hypotension, unspecified hypotension type. stable     4. Metabolic encephalopathy.    at baseline, patient is bedbound/non-verbal but per daughter is able to move eyes and recognize family      5. Atrial fibrillation, unspecified type. stable   hold coumadin for procedure on friday   6.Cerebrovascular accident (CVA), unspecified mechanism. Plan: - functional quadriplegia, with contractures, dysphagia s/p PEG  ??Problem: Dysphagia.  Plan: - s/p PEG  - peg tube replacement on Monday    keep npo after Sunday midnight  continue tube feed for now     7.Seizure disorder.    c/w valproic acid bid.      patient is DNR / DNI   no aggressive measure as per family

## 2017-11-26 LAB
BASOPHILS # BLD AUTO: 0.01 K/UL — SIGNIFICANT CHANGE UP (ref 0–0.2)
BASOPHILS NFR BLD AUTO: 0.1 % — SIGNIFICANT CHANGE UP (ref 0–2)
BUN SERPL-MCNC: 11 MG/DL — SIGNIFICANT CHANGE UP (ref 7–23)
CALCIUM SERPL-MCNC: 8.6 MG/DL — SIGNIFICANT CHANGE UP (ref 8.4–10.5)
CHLORIDE SERPL-SCNC: 105 MMOL/L — SIGNIFICANT CHANGE UP (ref 98–107)
CO2 SERPL-SCNC: 28 MMOL/L — SIGNIFICANT CHANGE UP (ref 22–31)
CREAT SERPL-MCNC: 0.43 MG/DL — LOW (ref 0.5–1.3)
EOSINOPHIL # BLD AUTO: 0.02 K/UL — SIGNIFICANT CHANGE UP (ref 0–0.5)
EOSINOPHIL NFR BLD AUTO: 0.2 % — SIGNIFICANT CHANGE UP (ref 0–6)
GLUCOSE SERPL-MCNC: 189 MG/DL — HIGH (ref 70–99)
HCT VFR BLD CALC: 40 % — SIGNIFICANT CHANGE UP (ref 39–50)
HGB BLD-MCNC: 12.6 G/DL — LOW (ref 13–17)
IMM GRANULOCYTES # BLD AUTO: 0.03 # — SIGNIFICANT CHANGE UP
IMM GRANULOCYTES NFR BLD AUTO: 0.3 % — SIGNIFICANT CHANGE UP (ref 0–1.5)
LYMPHOCYTES # BLD AUTO: 1.28 K/UL — SIGNIFICANT CHANGE UP (ref 1–3.3)
LYMPHOCYTES # BLD AUTO: 14.1 % — SIGNIFICANT CHANGE UP (ref 13–44)
MCHC RBC-ENTMCNC: 30.6 PG — SIGNIFICANT CHANGE UP (ref 27–34)
MCHC RBC-ENTMCNC: 31.5 % — LOW (ref 32–36)
MCV RBC AUTO: 97.1 FL — SIGNIFICANT CHANGE UP (ref 80–100)
MONOCYTES # BLD AUTO: 0.68 K/UL — SIGNIFICANT CHANGE UP (ref 0–0.9)
MONOCYTES NFR BLD AUTO: 7.5 % — SIGNIFICANT CHANGE UP (ref 2–14)
NEUTROPHILS # BLD AUTO: 7.06 K/UL — SIGNIFICANT CHANGE UP (ref 1.8–7.4)
NEUTROPHILS NFR BLD AUTO: 77.8 % — HIGH (ref 43–77)
NRBC # FLD: 0 — SIGNIFICANT CHANGE UP
PLATELET # BLD AUTO: 258 K/UL — SIGNIFICANT CHANGE UP (ref 150–400)
PMV BLD: 11.2 FL — SIGNIFICANT CHANGE UP (ref 7–13)
POTASSIUM SERPL-MCNC: 4.5 MMOL/L — SIGNIFICANT CHANGE UP (ref 3.5–5.3)
POTASSIUM SERPL-SCNC: 4.5 MMOL/L — SIGNIFICANT CHANGE UP (ref 3.5–5.3)
RBC # BLD: 4.12 M/UL — LOW (ref 4.2–5.8)
RBC # FLD: 14.2 % — SIGNIFICANT CHANGE UP (ref 10.3–14.5)
SODIUM SERPL-SCNC: 143 MMOL/L — SIGNIFICANT CHANGE UP (ref 135–145)
WBC # BLD: 9.08 K/UL — SIGNIFICANT CHANGE UP (ref 3.8–10.5)
WBC # FLD AUTO: 9.08 K/UL — SIGNIFICANT CHANGE UP (ref 3.8–10.5)

## 2017-11-26 PROCEDURE — 71010: CPT | Mod: 26

## 2017-11-26 PROCEDURE — 99233 SBSQ HOSP IP/OBS HIGH 50: CPT

## 2017-11-26 RX ORDER — IPRATROPIUM/ALBUTEROL SULFATE 18-103MCG
3 AEROSOL WITH ADAPTER (GRAM) INHALATION EVERY 4 HOURS
Qty: 0 | Refills: 0 | Status: DISCONTINUED | OUTPATIENT
Start: 2017-11-26 | End: 2017-11-30

## 2017-11-26 RX ORDER — VANCOMYCIN HCL 1 G
1000 VIAL (EA) INTRAVENOUS ONCE
Qty: 0 | Refills: 0 | Status: COMPLETED | OUTPATIENT
Start: 2017-11-26 | End: 2017-11-26

## 2017-11-26 RX ORDER — IPRATROPIUM/ALBUTEROL SULFATE 18-103MCG
3 AEROSOL WITH ADAPTER (GRAM) INHALATION ONCE
Qty: 0 | Refills: 0 | Status: COMPLETED | OUTPATIENT
Start: 2017-11-26 | End: 2017-11-26

## 2017-11-26 RX ORDER — METRONIDAZOLE 500 MG
TABLET ORAL
Qty: 0 | Refills: 0 | Status: DISCONTINUED | OUTPATIENT
Start: 2017-11-26 | End: 2017-11-30

## 2017-11-26 RX ORDER — METRONIDAZOLE 500 MG
500 TABLET ORAL EVERY 8 HOURS
Qty: 0 | Refills: 0 | Status: DISCONTINUED | OUTPATIENT
Start: 2017-11-26 | End: 2017-11-30

## 2017-11-26 RX ORDER — METRONIDAZOLE 500 MG
500 TABLET ORAL ONCE
Qty: 0 | Refills: 0 | Status: COMPLETED | OUTPATIENT
Start: 2017-11-26 | End: 2017-11-26

## 2017-11-26 RX ORDER — FUROSEMIDE 40 MG
20 TABLET ORAL ONCE
Qty: 0 | Refills: 0 | Status: COMPLETED | OUTPATIENT
Start: 2017-11-26 | End: 2017-11-26

## 2017-11-26 RX ORDER — MORPHINE SULFATE 50 MG/1
1 CAPSULE, EXTENDED RELEASE ORAL EVERY 6 HOURS
Qty: 0 | Refills: 0 | Status: DISCONTINUED | OUTPATIENT
Start: 2017-11-26 | End: 2017-11-27

## 2017-11-26 RX ADMIN — Medication 3 MILLILITER(S): at 14:03

## 2017-11-26 RX ADMIN — Medication 50 MILLIGRAM(S): at 06:55

## 2017-11-26 RX ADMIN — Medication 50 MILLIGRAM(S): at 23:51

## 2017-11-26 RX ADMIN — Medication 250 MILLIGRAM(S): at 18:00

## 2017-11-26 RX ADMIN — Medication 50 MILLIGRAM(S): at 16:12

## 2017-11-26 RX ADMIN — Medication 3 MILLILITER(S): at 20:16

## 2017-11-26 RX ADMIN — Medication 3 MILLILITER(S): at 17:08

## 2017-11-26 RX ADMIN — MORPHINE SULFATE 1 MILLIGRAM(S): 50 CAPSULE, EXTENDED RELEASE ORAL at 13:15

## 2017-11-26 RX ADMIN — Medication 100 MILLIGRAM(S): at 22:51

## 2017-11-26 RX ADMIN — Medication 20 MILLIGRAM(S): at 13:38

## 2017-11-26 RX ADMIN — Medication 250 MILLIGRAM(S): at 06:44

## 2017-11-26 RX ADMIN — Medication 100 MILLIGRAM(S): at 14:51

## 2017-11-26 RX ADMIN — Medication 3 MILLILITER(S): at 01:05

## 2017-11-26 RX ADMIN — PANTOPRAZOLE SODIUM 40 MILLIGRAM(S): 20 TABLET, DELAYED RELEASE ORAL at 06:44

## 2017-11-26 RX ADMIN — Medication 250 MILLIGRAM(S): at 13:40

## 2017-11-26 NOTE — PROGRESS NOTE ADULT - SUBJECTIVE AND OBJECTIVE BOX
82y old  Male who presents with a chief complaint of 82M p/w SOB x hours (20 Nov 2017 02:52)    patient seen and examine at bed side   comfortable   no fever , peg was cancelled as he was Tachypenic, peg replacement on Monday      MEDICATIONS  (STANDING):  ALBUTerol/ipratropium for Nebulization 3 milliLiter(s) Nebulizer every 4 hours  ALBUTerol/ipratropium for Nebulization. 3 milliLiter(s) Nebulizer once  aztreonam  IVPB 1000 milliGRAM(s) IV Intermittent every 8 hours  aztreonam  IVPB      metroNIDAZOLE  IVPB 500 milliGRAM(s) IV Intermittent once  metroNIDAZOLE  IVPB 500 milliGRAM(s) IV Intermittent every 8 hours  metroNIDAZOLE  IVPB      pantoprazole    Tablet 40 milliGRAM(s) Oral before breakfast  valproic  acid Syrup 250 milliGRAM(s) Oral every 12 hours    MEDICATIONS  (PRN):  morphine  - Injectable 1 milliGRAM(s) IV Push every 6 hours PRN tachypneia        Vital Signs Last 24 Hrs  T(C): 37.1 (26 Nov 2017 07:17), Max: 37.1 (26 Nov 2017 07:17)  T(F): 98.8 (26 Nov 2017 07:17), Max: 98.8 (26 Nov 2017 07:17)  HR: 96 (26 Nov 2017 13:34) (85 - 98)  BP: 113/77 (26 Nov 2017 07:17) (113/77 - 150/98)  BP(mean): --  RR: 26 (26 Nov 2017 13:34) (20 - 26)  SpO2: 88% (26 Nov 2017 13:34) (88% - 98%)      PHYSICAL EXAM:  GENERAL: NAD, lying in bed  HEAD:  NC/AT  NECK: supple  NERVOUS SYSTEM: non-verbal, not moving extremities  PT/INR - ( 25 Nov 2017 06:49 )   PT: 18.7 SEC;   INR: 1.65          CHEST/LUNG: NC in place, coarse upper airway sounds, in no respiratory distress  HEART: S1S2 RRR  ABDOMEN: soft, non-tender, +PEG  EXTREMITIES:  no c/c/e, RUE contracture      LABS:                                         12.6   9.08  )-----------( 258      ( 26 Nov 2017 13:14 )             40.0         11-26    143  |  105  |  11  ----------------------------<  189<H>  4.5   |  28  |  0.43<L>    Ca    8.6      26 Nov 2017 13:14        RADIOLOGY & ADDITIONAL TESTS:    Imaging Personally Reviewed:  [ x] YES   CXR: small right midlung atelectasis    Consultant(s) Notes Reviewed:  GI    Care Discussed with Consultants/Other Providers:

## 2017-11-26 NOTE — CHART NOTE - NSCHARTNOTEFT_GEN_A_CORE
RRT called for hypoxia. Patient satting in low 80s on NRB and decreased mental status. Patient nonverbal and bedbound at baseline. He is DNR/DNI. Initially presented with respiratory failure 2/2 influenza infection. He is on aztreonam to cover for aspiration PNA. Patient became more arousable during RRT and closer to baseline per family at bedside. Patient afebrile, systolic BP in 140s. Patient has diffuse rhonchi on exam. Will start on High flow NC at 30 L and 100% given persistent hypoxia on NRB. Family confirms that patient is DNR/DNI, but would like to continue medical treatment as appropriate. Check CXR, basic labs. Add Vancomycin and Flagyl to cover for MRSA and anaerobes as Aztreonam is not effective in covering these organisms. Consider ID c/s. Will give Lasix 20 mg IV x1 for possible pulmonary edema. Duoneb around the clock. Chest PT. Suctioning prn. Family and primary team updated and aware of plan. No need to transfer to RCU for continuous pulse ox; O2 sat to be checked Q4H.

## 2017-11-26 NOTE — PROGRESS NOTE ADULT - ASSESSMENT
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 Appears to be clinically improving, more alert per family.      1. Acute respiratory failure with hypoxia.     likely 2/2 parainfluenza +/- aspiration pneumonia   - c/w supplemental oxygen, currently O2 saturation in upper 90s  - patient is DNR/DNI per GOC  - c/w chest PT, frequent suctioning, supportive care  - has home O2 prn.   New infiltrate   Azectram added for possible aspiration pneumonia   f/u cultures       2. Parainfluenza type 1 infection.   - c/w supportive care for parainfluenza 1, on clindamycin to cover anaerobes (pt with reported PCN allergy),till 11/24    3. Hypotension, unspecified hypotension type. stable     4. Metabolic encephalopathy.    at baseline, patient is bedbound/non-verbal but per daughter is able to move eyes and recognize family      5. Atrial fibrillation, unspecified type. stable   hold coumadin for procedure on friday   6.Cerebrovascular accident (CVA), unspecified mechanism. Plan: - functional quadriplegia, with contractures, dysphagia s/p PEG  6.Problem: Dysphagia.  Plan: - s/p PEG  - peg tube replacement on Monday    keep npo after Sunday midnight  continue tube feed for now     7.Seizure disorder.    c/w valproic acid bid.      patient is DNR / DNI   no aggressive measure as per family

## 2017-11-26 NOTE — CHART NOTE - NSCHARTNOTEFT_GEN_A_CORE
RRT called for oxygen desaturation.    Patient O2 Sat on 3 L NC at 76%. Patient placed on 100% nonrebreather, suctioned.   Possible aspiration, discussed with family their wishes.  No intubation requested.    Patient not candidate for bipap 2/2 mentation and possible aspiration.    Morphine 1 mg given to help with rate of breathing.  Chest xray ordered, Lasix 20 mg IVP given.   Vancomycin 1 gm , Flagyl 500 mg every 8 hours ordered.  Will call ID c/s.  Follow patient closely.

## 2017-11-27 DIAGNOSIS — R53.81 OTHER MALAISE: ICD-10-CM

## 2017-11-27 DIAGNOSIS — R13.10 DYSPHAGIA, UNSPECIFIED: ICD-10-CM

## 2017-11-27 DIAGNOSIS — Z51.5 ENCOUNTER FOR PALLIATIVE CARE: ICD-10-CM

## 2017-11-27 DIAGNOSIS — Z66 DO NOT RESUSCITATE: ICD-10-CM

## 2017-11-27 DIAGNOSIS — G81.91 HEMIPLEGIA, UNSPECIFIED AFFECTING RIGHT DOMINANT SIDE: ICD-10-CM

## 2017-11-27 DIAGNOSIS — J69.0 PNEUMONITIS DUE TO INHALATION OF FOOD AND VOMIT: ICD-10-CM

## 2017-11-27 LAB
BUN SERPL-MCNC: 13 MG/DL — SIGNIFICANT CHANGE UP (ref 7–23)
CALCIUM SERPL-MCNC: 8.1 MG/DL — LOW (ref 8.4–10.5)
CHLORIDE SERPL-SCNC: 102 MMOL/L — SIGNIFICANT CHANGE UP (ref 98–107)
CO2 SERPL-SCNC: 32 MMOL/L — HIGH (ref 22–31)
CREAT SERPL-MCNC: 0.45 MG/DL — LOW (ref 0.5–1.3)
GLUCOSE SERPL-MCNC: 104 MG/DL — HIGH (ref 70–99)
HCT VFR BLD CALC: 34 % — LOW (ref 39–50)
HGB BLD-MCNC: 10.8 G/DL — LOW (ref 13–17)
INR BLD: 1.25 — HIGH (ref 0.88–1.17)
MCHC RBC-ENTMCNC: 30.5 PG — SIGNIFICANT CHANGE UP (ref 27–34)
MCHC RBC-ENTMCNC: 31.8 % — LOW (ref 32–36)
MCV RBC AUTO: 96 FL — SIGNIFICANT CHANGE UP (ref 80–100)
NRBC # FLD: 0 — SIGNIFICANT CHANGE UP
PLATELET # BLD AUTO: 178 K/UL — SIGNIFICANT CHANGE UP (ref 150–400)
PMV BLD: 11.7 FL — SIGNIFICANT CHANGE UP (ref 7–13)
POTASSIUM SERPL-MCNC: 3.9 MMOL/L — SIGNIFICANT CHANGE UP (ref 3.5–5.3)
POTASSIUM SERPL-SCNC: 3.9 MMOL/L — SIGNIFICANT CHANGE UP (ref 3.5–5.3)
PROTHROM AB SERPL-ACNC: 14.1 SEC — HIGH (ref 9.8–13.1)
RBC # BLD: 3.54 M/UL — LOW (ref 4.2–5.8)
RBC # FLD: 14.3 % — SIGNIFICANT CHANGE UP (ref 10.3–14.5)
SODIUM SERPL-SCNC: 145 MMOL/L — SIGNIFICANT CHANGE UP (ref 135–145)
WBC # BLD: 5.84 K/UL — SIGNIFICANT CHANGE UP (ref 3.8–10.5)
WBC # FLD AUTO: 5.84 K/UL — SIGNIFICANT CHANGE UP (ref 3.8–10.5)

## 2017-11-27 PROCEDURE — 99233 SBSQ HOSP IP/OBS HIGH 50: CPT

## 2017-11-27 PROCEDURE — 99223 1ST HOSP IP/OBS HIGH 75: CPT | Mod: GC

## 2017-11-27 PROCEDURE — 93010 ELECTROCARDIOGRAM REPORT: CPT

## 2017-11-27 RX ORDER — MORPHINE SULFATE 50 MG/1
1 CAPSULE, EXTENDED RELEASE ORAL ONCE
Qty: 0 | Refills: 0 | Status: DISCONTINUED | OUTPATIENT
Start: 2017-11-27 | End: 2017-11-27

## 2017-11-27 RX ORDER — MORPHINE SULFATE 50 MG/1
1 CAPSULE, EXTENDED RELEASE ORAL EVERY 4 HOURS
Qty: 0 | Refills: 0 | Status: DISCONTINUED | OUTPATIENT
Start: 2017-11-27 | End: 2017-11-29

## 2017-11-27 RX ADMIN — Medication 3 MILLILITER(S): at 16:31

## 2017-11-27 RX ADMIN — Medication 3 MILLILITER(S): at 13:00

## 2017-11-27 RX ADMIN — Medication 50 MILLIGRAM(S): at 14:44

## 2017-11-27 RX ADMIN — Medication 3 MILLILITER(S): at 00:51

## 2017-11-27 RX ADMIN — Medication 250 MILLIGRAM(S): at 06:17

## 2017-11-27 RX ADMIN — MORPHINE SULFATE 1 MILLIGRAM(S): 50 CAPSULE, EXTENDED RELEASE ORAL at 01:40

## 2017-11-27 RX ADMIN — Medication 50 MILLIGRAM(S): at 07:24

## 2017-11-27 RX ADMIN — Medication 100 MILLIGRAM(S): at 06:16

## 2017-11-27 RX ADMIN — Medication 3 MILLILITER(S): at 20:12

## 2017-11-27 RX ADMIN — MORPHINE SULFATE 1 MILLIGRAM(S): 50 CAPSULE, EXTENDED RELEASE ORAL at 13:11

## 2017-11-27 RX ADMIN — Medication 100 MILLIGRAM(S): at 13:10

## 2017-11-27 RX ADMIN — Medication 50 MILLIGRAM(S): at 22:24

## 2017-11-27 RX ADMIN — MORPHINE SULFATE 1 MILLIGRAM(S): 50 CAPSULE, EXTENDED RELEASE ORAL at 01:38

## 2017-11-27 RX ADMIN — MORPHINE SULFATE 1 MILLIGRAM(S): 50 CAPSULE, EXTENDED RELEASE ORAL at 02:10

## 2017-11-27 RX ADMIN — MORPHINE SULFATE 1 MILLIGRAM(S): 50 CAPSULE, EXTENDED RELEASE ORAL at 01:08

## 2017-11-27 RX ADMIN — Medication 3 MILLILITER(S): at 09:24

## 2017-11-27 RX ADMIN — Medication 100 MILLIGRAM(S): at 21:17

## 2017-11-27 RX ADMIN — Medication 3 MILLILITER(S): at 04:43

## 2017-11-27 NOTE — PROGRESS NOTE ADULT - PROBLEM SELECTOR PLAN 1
Continue Aztreonam and Flagyl IV.  BiPAP for respiratory support.  DNR/DNI.  Will discuss with family about goals of care as prognosis is poor.

## 2017-11-27 NOTE — CHART NOTE - NSCHARTNOTEFT_GEN_A_CORE
ADS night coverage    Notified by RN pt is desaturating to high 70's on high flow. Pt seen and evaluated at bedside. Pt is in mild respiratory distress. On physical exam rhonchi noted throughout bilateral lobes. Pt was suctioned thoroughly and saturation improved to high 90's. Morphine 1mg IV push x 1 was given. Pt responded appropriately. Will monitor pt. BIPAP on standby if pt desaturates and is not responding to suction.     Tra DALE    [id number]

## 2017-11-27 NOTE — CONSULT NOTE ADULT - ATTENDING COMMENTS
Pt. seen/examined- family at bedside. Requested to evaluate for PEG replacement. Pt. s/p CVA with OP dysphagia and need for long term enteral nutritional support. Current 20Fr. GT has been in place for approximately 2 years with associated breakdown of the tubing prompting the request for replacement. Pt. is non-verbal;. Abdomen is soft/NT/ND. PEG site is D/I- GT is somewhat dilated with black discoloration. Options regarding PEG replacement were discussed with the family and the pros/cons and R/B/A of a balloon replacement tube vs. non-balloon replacement were discussed. Family wishes to have a non-balloon replacement inserted as pt. resides at home and this may be more secure with less risk of inadvertent removal. Tentatively planned for 11/24. Hold coumadin for now.
82M h/o A-fib, CVA with residual R hemiparesis, dementia, non-verbal, s/p PEG tube, with 24-hour care who presents to the ED with hypoxic respiratory failure and septic shock 2/2 presumed PNA.   pancx, Abx, check RVP  contiue IVF (2nd liter just started)  DNR/DNI  consider midodrine via peg if doesn't not respond to IVF
Pt seen with fellow.  Agree with above.  Goal is to optimize comfort.  Pt is dnr/dni

## 2017-11-27 NOTE — CONSULT NOTE ADULT - SUBJECTIVE AND OBJECTIVE BOX
Palliative Medicine Consult Note    Interval HPI/Overnight Events:  Patient seen and examined today.  No family at bedside.  Pt started on continuous Bipap overnight due to desats in mid-70s.  He remains non-verbal, unable to follow simple commands.  Palliative Medicine consult for goals of care - pt is currently DNR/I.      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 EMS offered intubation in the field, was declined by son. Admission performed at 3am, son was not available at bedside, given lack of need for urgent changes in management, and goals of care being close to comfort care, discussion with family deferred until AM. History as per record review.   At baseline, pt is nonverbal due to CVAs, with contractures, on a hospital bed at home, with 3 aides round the clock. Son told EMS not to intubate when they asked.  EMS noted rhonchi bilat, sat 85% RA with an ETCO2 of 50.     In ED,   Tm 102.1, HR 80 - 110, BP: 72/50 - 144/89, RR 20, o2 100% on o2  In the ED, patient noted to be hypoxic to 85% on RA.  Tmax 102.1F, -110.  Initial /89.  He was started on BiPAP, with subsequent decrease in his BP to 72/50.  The patient was   given clindamycin 600mg IV x1, acetaminophen 1g IV x1, duoneb x1, NS bolus 1L x1.  Brief labs: WBC 6.57, Hgb 12.9, plt 15, INR 2.57, Na 134, K 4.3, Cr 0.53, Maciel negative x1, lactate 1.4.  UA   not remarkable.  CXR with haziness RLL. (20 Nov 2017 02:52)        PERTINENT PMH REVIEWED:  [x] YES [ ] NO           SOCIAL HISTORY:  Significant other/partner:  [x] YES wife Js [ ] NO            Children:  [x] YES daughters Belem and Sandra [ ] NO                   Jehovah's witness/Spirituality: unable to assess  Substance hx:  [ ] YES   [x] NO           Tobacco hx:  [ ] YES  [x] NO             Alcohol hx: [ ] YES  [x] NO        Home Opioid hx:  [ ] YES  [x] NO   Living Situation: [x] Home  [ ] Long term care  [ ] Rehab    REFERRALS:   [ ] Chaplaincy  [ ] Hospice  [ ] Child Life  [x] Social Work  [x] Case management [ ] Holistic Therapy     FAMILY HISTORY:  No pertinent family history in first degree relatives    [x] Family history non contributory     BASELINE ADLs (prior to admission):  Independent [ ] moderately [x] fully   Dependent   [ ] moderately [ ] fully    ADVANCE DIRECTIVES:  [x] YES [ ] NO   DNR [x] YES [ ] NO                      MOLST  [ ] YES [x] NO    Living Will  [x] YES not in chart [ ] NO    Health Care Proxy [x] YES Belem is HCP per records, no documentation in paper chart [ ] NO      [ ] Surrogate  [x] HCP Belem  [ ] Guardian:                                                                  Phone#: [phone]    Allergies    penicillins (Unknown)    Intolerances      MEDICATIONS  (STANDING):  ALBUTerol/ipratropium for Nebulization 3 milliLiter(s) Nebulizer every 4 hours  aztreonam  IVPB 1000 milliGRAM(s) IV Intermittent every 8 hours  aztreonam  IVPB      metroNIDAZOLE  IVPB 500 milliGRAM(s) IV Intermittent every 8 hours  metroNIDAZOLE  IVPB      pantoprazole    Tablet 40 milliGRAM(s) Oral before breakfast  valproic  acid Syrup 250 milliGRAM(s) Oral every 12 hours    MEDICATIONS  (PRN):  morphine  - Injectable 1 milliGRAM(s) IV Push every 6 hours PRN tachypneia    PRESENT SYMPTOMS:  Source: [ ] Patient   [ ] Family   [x] Team     Pain: [ ] YES [x] NO    Dyspnea: [x] YES [ ] NO   Anxiety: [ ] YES [x] NO  Fatigue: [x] YES [ ] NO   Nausea: [ ] YES [x] NO  Loss of appetite: [x] YES [ ] NO   Constipation: [ ] YES [x] NO     Other Symptoms:  [x] All other review of systems negative   [ ] Unable to obtain due to poor mentation     Karnofsky Performance Score/Palliative Performance Status Version 2:         %  Protein Calorie Malutrition:  [ ] Mild   [ ] Moderate   [ ] Severe     Vital Signs Last 24 Hrs  T(C): 36.9 (27 Nov 2017 05:19), Max: 36.9 (26 Nov 2017 13:25)  T(F): 98.5 (27 Nov 2017 05:19), Max: 98.5 (26 Nov 2017 13:25)  HR: 94 (27 Nov 2017 11:33) (81 - 186)  BP: 93/58 (27 Nov 2017 05:19) (93/58 - 143/55)  BP(mean): --  RR: 26 (27 Nov 2017 06:15) (21 - 30)  SpO2: 99% (27 Nov 2017 11:33) (74% - 100%)    Physical Exam:    General: [ ] Alert,  A&O x     [x] lethargic   [ ] Agitated   [ ] Cachexia   HEENT: [ ] Normal   [ ] Dry mouth   [ ] ET Tube    [ ] Trach [x] on bipap  Lungs: [ ] Clear [x] Rhonchi  [ ] Crackles [ ] Wheezing [ ] Tachypnea  [ ] Audible excessive secretions   Cardiovascular:  [ x] Regular rate and rhythm  [ ] Irregular [ ] Tachycardia   [ ] Bradycardia   Abdomen: [x] Soft  [x]Mild Distended  [ ]  [ ] +BS  [ ] Non tender [ ] Tender  [ ]PEG   [ ] NGT   Last BM:     Genitourinary: [ ] Normal [x] Incontinent   [ ] Oliguria/Anuria   [ ] Kirkpatrick  Musculoskeletal:  [ ] Normal   [x] Generalized weakness  [x] Bedbound   Neurological: [ ] No focal deficits  [ ] Cognitive impairment   [x]non-verbal  Skin: [x] Normal   [ ] Pressure ulcers     LABS:                        10.8   5.84  )-----------( 178      ( 27 Nov 2017 07:09 )             34.0     11-27    145  |  102  |  13  ----------------------------<  104<H>  3.9   |  32<H>  |  0.45<L>    Ca    8.1<L>      27 Nov 2017 07:05      PT/INR - ( 27 Nov 2017 07:09 )   PT: 14.1 SEC;   INR: 1.25           I&O's Summary    26 Nov 2017 07:01  -  27 Nov 2017 07:00  --------------------------------------------------------  IN: 240 mL / OUT: 0 mL / NET: 240 mL        RADIOLOGY & ADDITIONAL STUDIES:    EXAM:  RAD CHEST PORTABLE IMMEDIATE        PROCEDURE DATE:  Nov 26 2017         INTERPRETATION:  COMPARISON: 11/23    CLINICAL INDICATION: Hypoxia, dementia.    Impression:  Portable chest dated 11/26 at 0122 hours shows new onset of a small right   and trace left pleural effusion.   Retrocardiac opacity likely represents atelectasis.  No evidence of pneumothorax  Dextroscoliosis.  Heart and mediastinum cannot be evaluated on this projection.

## 2017-11-27 NOTE — CONSULT NOTE ADULT - PROBLEM SELECTOR RECOMMENDATION 5
will call family to discuss options including hospice eval  pt is dnr/dni  goals are to maximize comfort

## 2017-11-27 NOTE — PROGRESS NOTE ADULT - ASSESSMENT
82M with sepsis POA from aspiration PNA due to old CVA with Rt hemiparesis and dysphagia on PEG complicated by acute hypoxic respiratory failure requiring BiPAP, Afib on coumadin, dementia.

## 2017-11-27 NOTE — PROVIDER CONTACT NOTE (OTHER) - REASON
Patient has been NPO since midnight and should Patient has been NPO since midnight and pantoprazole should be held

## 2017-11-27 NOTE — CONSULT NOTE ADULT - PROBLEM SELECTOR RECOMMENDATION 2
supportive care  pt has history of vascular dementia with 24 hr hha  unsure if pt was more interactive prior to  hospitalization

## 2017-11-27 NOTE — CONSULT NOTE ADULT - PROBLEM SELECTOR RECOMMENDATION 9
Likely 2/2 parainfluenza with possible aspiration.  On continuous BiPAP.  According to chart, family aware and requesting a family meeting.  - Continue BiPAP, pt is DNR/I  - Continue Abx per primary team

## 2017-11-27 NOTE — CONSULT NOTE ADULT - PROBLEM SELECTOR RECOMMENDATION 6
Patient is DNR/I.  On Bipap for ARF 2/2 parainfluenza.  Overall, pt's condition is poor and Palliative team to meet with family to discuss GOC.    - Left voicemail message with daughter Belem to call us back to set up family meeting date/time

## 2017-11-27 NOTE — CONSULT NOTE ADULT - ASSESSMENT
83 yo M CVA 11 yrs ago with residual R hemiparesis, on seizure ppx, c/b dysphagia/aspiration s/p PEG placement, dependent for all ADLs, afib previously on warfarin, p/w acute hypoxic respiratory failure, septic shock 2/2 PNA (RVP positive for parainfluenza 1, +/- component of aspiration), family opting for DNR/DNI, no aggressive measures.  Palliative Care consult to discuss GOC.

## 2017-11-27 NOTE — PROGRESS NOTE ADULT - SUBJECTIVE AND OBJECTIVE BOX
CC: F/U for respiratory failure    SUBJECTIVE / OVERNIGHT EVENTS:  Patient unable to provide history due to lethargy and dementia.   No F/C, N/V, CP, SOB, Cough, lightheadedness, dizziness, abdominal pain, diarrhea, dysuria.    MEDICATIONS  (STANDING):  ALBUTerol/ipratropium for Nebulization 3 milliLiter(s) Nebulizer every 4 hours  aztreonam  IVPB 1000 milliGRAM(s) IV Intermittent every 8 hours  aztreonam  IVPB      metroNIDAZOLE  IVPB 500 milliGRAM(s) IV Intermittent every 8 hours  metroNIDAZOLE  IVPB      pantoprazole    Tablet 40 milliGRAM(s) Oral before breakfast  valproic  acid Syrup 250 milliGRAM(s) Oral every 12 hours    MEDICATIONS  (PRN):  morphine  - Injectable 1 milliGRAM(s) IV Push every 4 hours PRN SOB      Vital Signs Last 24 Hrs  T(C): 37.4 (27 Nov 2017 13:12), Max: 37.4 (27 Nov 2017 13:12)  T(F): 99.3 (27 Nov 2017 13:12), Max: 99.3 (27 Nov 2017 13:12)  HR: 96 (27 Nov 2017 13:12) (81 - 186)  BP: 114/83 (27 Nov 2017 13:12) (93/58 - 143/55)  BP(mean): --  RR: 22 (27 Nov 2017 13:12) (21 - 30)  SpO2: 96% (27 Nov 2017 13:12) (74% - 100%)  CAPILLARY BLOOD GLUCOSE        I&O's Summary    26 Nov 2017 07:01  -  27 Nov 2017 07:00  --------------------------------------------------------  IN: 240 mL / OUT: 0 mL / NET: 240 mL        PHYSICAL EXAM:  GENERAL: NAD, not responsive to verbal stimuli.  HEAD:  Atraumatic, Normocephalic, BiPAP mask in place.  EYES: EOMI, PERRLA, conjunctiva and sclera clear  NECK: Supple, No JVD  CHEST/LUNG: Coarse breath sounds due to BiPAP.    HEART: Regular rate and rhythm; No murmurs, rubs, or gallops  ABDOMEN: Soft, Nontender, Nondistended; Bowel sounds present  EXTREMITIES:  2+ Peripheral Pulses, No clubbing, cyanosis, or edema  PSYCH: Calm  NEUROLOGY: Responds to noxious stimuli.  SKIN: No rashes or lesions    LABS:                        10.8   5.84  )-----------( 178      ( 27 Nov 2017 07:09 )             34.0     11-27    145  |  102  |  13  ----------------------------<  104<H>  3.9   |  32<H>  |  0.45<L>    Ca    8.1<L>      27 Nov 2017 07:05      PT/INR - ( 27 Nov 2017 07:09 )   PT: 14.1 SEC;   INR: 1.25                    RADIOLOGY & ADDITIONAL TESTS:  < from: Xray Chest 1 View AP-PORTABLE IMMEDIATE (11.26.17 @ 13:39) >  EXAM:  RAD CHEST PORTABLE IMMEDIATE        PROCEDURE DATE:  Nov 26 2017         INTERPRETATION:  COMPARISON: 11/23    CLINICAL INDICATION: Hypoxia, dementia.    Impression:  Portable chest dated 11/26 at 0122 hours shows new onset of a small right   and trace left pleural effusion.   Retrocardiac opacity likely represents atelectasis.  No evidence of pneumothorax  Dextroscoliosis.  Heart and mediastinum cannot be evaluated on this projection.          RJ DAVIS M.D., ATTENDING RADIOLOGIST  This document has been electronically signed. Nov 26 2017  3:09PM    < end of copied text >    Imaging Personally Reviewed:    Care Discussed with Consultants/Other Providers:

## 2017-11-27 NOTE — CONSULT NOTE ADULT - PROBLEM SELECTOR RECOMMENDATION 4
Residual right hemiparesis s/p CVA 11 years ago, non-verbal at baseline.  Today, unable to follow simple commands  - Will schedule family meeting to discuss GOC

## 2017-11-27 NOTE — CHART NOTE - NSCHARTNOTEFT_GEN_A_CORE
Called by primary nurse, per respiratory therapist Bipap settings must be adjusted. Patient has been on IPAP 20 EPAp10.

## 2017-11-27 NOTE — CONSULT NOTE ADULT - SUBJECTIVE AND OBJECTIVE BOX
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 EMS offered intubation in the field, was declined by son. Admission performed at 3am, son was not available at bedside, given lack of need for urgent changes in management, and goals of care being close to comfort care, discussion with family deferred until AM. History as per record review.   At baseline, pt is nonverbal due to CVAs, with contractures, on a hospital bed at home, with 3 aides round the clock. Son told EMS not to intubate when they asked.  EMS noted rhonchi bilat, sat 85% RA with an ETCO2 of 50.     Pt seen with BIPAP on.  Appears sob at rest.  Non verbal  No family in room  Nurse stated family just left      PERTINENT PMH REVIEWED:  [ x] YES [ ] NO           SOCIAL HISTORY:  Significant other/partner:  [x ] YES  [ ] NO            Children:  [x ] YES  [ ] NO                   Sikhism/Spirituality:  Substance hx:  [ ] YES   [x ] NO           Tobacco hx:  [ ] YES  [x ] NO             Alcohol hx: [ ] YES  [x ] NO        Home Opioid hx:  [ ] YES  [x ] NO   Living Situation: [x ] Home  [ ] Long term care  [ ] Rehab    FAMILY HISTORY:  No pertinent family history in first degree relatives    [x ] Family history non contributory     BASELINE ADLs (prior to admission):  Independent [ ] moderately [ ] fully   Dependent   [ ] moderately [x ] fully    Code Status:                      MOLST  [ ] YES [ ] NO    Living Will  [ ] YES [ ] NO    Health Care Proxy [ ] YES  [ ] NO      [ x] Surrogate  [ ] HCP  [ ] Guardian:              see emr                                                    Phone#:    Allergies    penicillins (Unknown)    Intolerances        MEDICATIONS  (STANDING):  ALBUTerol/ipratropium for Nebulization 3 milliLiter(s) Nebulizer every 4 hours  aztreonam  IVPB 1000 milliGRAM(s) IV Intermittent every 8 hours  aztreonam  IVPB      metroNIDAZOLE  IVPB 500 milliGRAM(s) IV Intermittent every 8 hours  metroNIDAZOLE  IVPB      pantoprazole    Tablet 40 milliGRAM(s) Oral before breakfast  valproic  acid Syrup 250 milliGRAM(s) Oral every 12 hours    MEDICATIONS  (PRN):  morphine  - Injectable 1 milliGRAM(s) IV Push every 6 hours PRN tachypneia      PRESENT SYMPTOMS:  Source: [ ] Patient   [ ] Family   [ x] Team     Pain: [ ] YES [ x] NO  Onset:                    Location:                          Duration:                     Aggravating factors:                        Relieving factors:    Radiation:              Timing:                             Severity:                      Character:    Dyspnea: [x ] YES [ ] NO - Mild [ ]  Moderate [x ]  Severe [ ]    Anxiety: [ ] YES [x ] NO  Fatigue: [x ] YES [ ] NO   Nausea: [ ] YES [x ] NO  Loss of appetite: [ x] YES [ ] NO   Constipation: [ ] YES [ x] NO     Other Symptoms:  [ ] All other review of systems negative   [x ] Unable to obtain due to poor mentation     Does patient meet criteria for Severe Protein Calorie Malnutrition?  Yes [ ]  No [ x]  PPSV 30% or below [ ]  Anasarca [ ]  Albumin < 2 [ ] Catabolic State [ ] Poor nutritional intake [ ] Significant weight loss [ ]      Palliative Performance Status Version 2:      10-20   %  ECOG -        Vital Signs Last 24 Hrs  T(C): 36.9 (27 Nov 2017 05:19), Max: 36.9 (26 Nov 2017 13:25)  T(F): 98.5 (27 Nov 2017 05:19), Max: 98.5 (26 Nov 2017 13:25)  HR: 94 (27 Nov 2017 11:33) (81 - 186)  BP: 93/58 (27 Nov 2017 05:19) (93/58 - 143/55)  BP(mean): --  RR: 26 (27 Nov 2017 06:15) (21 - 30)  SpO2: 99% (27 Nov 2017 11:33) (74% - 100%)    Physical Exam:    General: [ ] Alert,  A&O x     [ x] lethargic   [ ] Agitated   [ ] Cachexia   HEENT: [ ] Normal   [ ] Dry mouth   [ ] ET Tube    [ ] Trach  +bipap  Lungs: [ ] Clear [ ] Rhonchi  [ ] Crackles [ ] Wheezing [x ] Tachypnea  [ ] Audible excessive secretions    Cardiovascular:  [ ] Regular rate and rhythm  [ ] Irregular [ x] Tachycardia   [ ] Bradycardia   Abdomen: [x ] Soft  [ ] Distended  [ ] +BS  [x ] Non tender [ ] Tender  [ x]PEG   [ ] NGT   Last BM:     Genitourinary: [ ] Normal [x ] Incontinent   [ ] Oliguria/Anuria   [ ] Kirkpatrick  Musculoskeletal:  [ ] Normal   [ ] Generalized weakness  [x ] Bedbound  [ ] Edema   Neurological: [ ] No focal deficits  [x ] Cognitive impairment     Skin: [ x] Normal   [ ] Pressure ulcers     LABS:                        10.8   5.84  )-----------( 178      ( 27 Nov 2017 07:09 )             34.0     11-27    145  |  102  |  13  ----------------------------<  104<H>  3.9   |  32<H>  |  0.45<L>    Ca    8.1<L>      27 Nov 2017 07:05      PT/INR - ( 27 Nov 2017 07:09 )   PT: 14.1 SEC;   INR: 1.25              I&O's Summary    26 Nov 2017 07:01  -  27 Nov 2017 07:00  --------------------------------------------------------  IN: 240 mL / OUT: 0 mL / NET: 240 mL        RADIOLOGY & ADDITIONAL STUDIES:    Referrals:  Hospice [ ]   Chaplaincy [ ]    Child Life [ ]   Social Work [ ]   Case Management [ ]   Holistic Therapy [ ]

## 2017-11-28 DIAGNOSIS — R06.00 DYSPNEA, UNSPECIFIED: ICD-10-CM

## 2017-11-28 DIAGNOSIS — Z71.89 OTHER SPECIFIED COUNSELING: ICD-10-CM

## 2017-11-28 LAB
BUN SERPL-MCNC: 14 MG/DL — SIGNIFICANT CHANGE UP (ref 7–23)
CALCIUM SERPL-MCNC: 8.3 MG/DL — LOW (ref 8.4–10.5)
CHLORIDE SERPL-SCNC: 100 MMOL/L — SIGNIFICANT CHANGE UP (ref 98–107)
CO2 SERPL-SCNC: 31 MMOL/L — SIGNIFICANT CHANGE UP (ref 22–31)
CREAT SERPL-MCNC: 0.39 MG/DL — LOW (ref 0.5–1.3)
GLUCOSE SERPL-MCNC: 92 MG/DL — SIGNIFICANT CHANGE UP (ref 70–99)
HCT VFR BLD CALC: 34.8 % — LOW (ref 39–50)
HGB BLD-MCNC: 11.1 G/DL — LOW (ref 13–17)
MCHC RBC-ENTMCNC: 30.4 PG — SIGNIFICANT CHANGE UP (ref 27–34)
MCHC RBC-ENTMCNC: 31.9 % — LOW (ref 32–36)
MCV RBC AUTO: 95.3 FL — SIGNIFICANT CHANGE UP (ref 80–100)
NRBC # FLD: 0 — SIGNIFICANT CHANGE UP
PLATELET # BLD AUTO: 182 K/UL — SIGNIFICANT CHANGE UP (ref 150–400)
PMV BLD: 11.6 FL — SIGNIFICANT CHANGE UP (ref 7–13)
POTASSIUM SERPL-MCNC: 4.5 MMOL/L — SIGNIFICANT CHANGE UP (ref 3.5–5.3)
POTASSIUM SERPL-SCNC: 4.5 MMOL/L — SIGNIFICANT CHANGE UP (ref 3.5–5.3)
RBC # BLD: 3.65 M/UL — LOW (ref 4.2–5.8)
RBC # FLD: 14.4 % — SIGNIFICANT CHANGE UP (ref 10.3–14.5)
SODIUM SERPL-SCNC: 142 MMOL/L — SIGNIFICANT CHANGE UP (ref 135–145)
WBC # BLD: 8.41 K/UL — SIGNIFICANT CHANGE UP (ref 3.8–10.5)
WBC # FLD AUTO: 8.41 K/UL — SIGNIFICANT CHANGE UP (ref 3.8–10.5)

## 2017-11-28 PROCEDURE — 99233 SBSQ HOSP IP/OBS HIGH 50: CPT | Mod: GC

## 2017-11-28 PROCEDURE — 99497 ADVNCD CARE PLAN 30 MIN: CPT

## 2017-11-28 PROCEDURE — 99233 SBSQ HOSP IP/OBS HIGH 50: CPT

## 2017-11-28 RX ADMIN — Medication 100 MILLIGRAM(S): at 14:45

## 2017-11-28 RX ADMIN — Medication 3 MILLILITER(S): at 04:49

## 2017-11-28 RX ADMIN — Medication 3 MILLILITER(S): at 16:30

## 2017-11-28 RX ADMIN — Medication 50 MILLIGRAM(S): at 14:46

## 2017-11-28 RX ADMIN — Medication 3 MILLILITER(S): at 00:12

## 2017-11-28 RX ADMIN — Medication 100 MILLIGRAM(S): at 22:02

## 2017-11-28 RX ADMIN — Medication 3 MILLILITER(S): at 13:16

## 2017-11-28 RX ADMIN — Medication 3 MILLILITER(S): at 20:32

## 2017-11-28 RX ADMIN — Medication 50 MILLIGRAM(S): at 06:46

## 2017-11-28 RX ADMIN — Medication 100 MILLIGRAM(S): at 05:16

## 2017-11-28 RX ADMIN — Medication 250 MILLIGRAM(S): at 05:16

## 2017-11-28 RX ADMIN — Medication 50 MILLIGRAM(S): at 23:07

## 2017-11-28 RX ADMIN — Medication 3 MILLILITER(S): at 08:57

## 2017-11-28 RX ADMIN — MORPHINE SULFATE 1 MILLIGRAM(S): 50 CAPSULE, EXTENDED RELEASE ORAL at 13:15

## 2017-11-28 NOTE — GOALS OF CARE CONVERSATION - PERSONAL ADVANCE DIRECTIVE - CONVERSATION DETAILS
Palliative care consult for goals of care and symptom management. Meeting with pt's spouse and dtr to discuss pt's overall medical condition including prognosis, treatment options, symptom management and transition of care options. Pt's family are aware of pt's overall poor prognosis. Family aware of pt's worsening respiratory status despite increased use of oxygen supports.  Spouse visibly upset and tearful although understanding and realistic. Family wish to focus on pt's overall comfort and quality of life. Family are aware that continued use of BIPAP is not providing pt with comfort  and likely will not change his overall poor prognosis. Family are amenable to weaning from BIPAP with understanding pt may pass away in hours to days. Families goal if for pt to pass at home. Education and information provided on hospice care. Family aware that pt may not be stable for d/c to home hospice. Family are amenable to referral to hospice care network. Emotional support provided to family regarding transition to hospice care and end of life concerns. Chaplaincy offered and declined. Family aware of palliative care team availability and contact information.

## 2017-11-28 NOTE — PROVIDER CONTACT NOTE (OTHER) - DATE AND TIME:
20-Nov-2017 15:45
27-Nov-2017 01:18
27-Nov-2017 05:37
28-Nov-2017 06:30
21-Nov-2017 17:09
27-Nov-2017 05:00
27-Nov-2017 21:17

## 2017-11-28 NOTE — PROGRESS NOTE ADULT - ASSESSMENT
81 yo M CVA 11 yrs ago with residual R hemiparesis, on seizure ppx, c/b dysphagia/aspiration s/p PEG placement, dependent for all ADLs, afib previously on warfarin, p/w acute hypoxic respiratory failure, septic shock 2/2 PNA (RVP positive for parainfluenza 1 and ? aspiration PNA.  He is DNR/DNI. Palliative Care consult to discuss GOC. 83 yo M CVA 11 yrs ago with residual R hemiparesis, on seizure ppx, c/b dysphagia/aspiration s/p PEG placement, dependent for all ADLs, afib previously on warfarin, p/w acute hypoxic respiratory failure, septic shock 2/2 PNA (RVP positive for parainfluenza 1 and ? aspiration PNA.  He is DNR/DNI.  Palliative Care consult to discuss GOC.

## 2017-11-28 NOTE — PROGRESS NOTE ADULT - PROBLEM SELECTOR PLAN 1
Continue Aztreonam and Flagyl IV.  discontinue BiPAP as per family wishes. DNR/DNI.  Poor prognosis.

## 2017-11-28 NOTE — PROGRESS NOTE ADULT - PROBLEM SELECTOR PLAN 6
Family meeting Family meeting held today with Dr. Lane, [facility] Dr. Bojorquez, and myself.  We discussed patient's condition, worsening respiratory status, and overall poor prognosis.  Patient is DNR/I.  Family expresses their goal is for patient to go home.  We discussed the option of hospice for additional support (pt has 24 HHA), however because pt on currently on continuous BiPAP, he may have hours to days once it is removed.  We reassured family that our team would focus our efforts on patient's comfort and symptoms.  Hospice referral has been made.  Will follow up with Hospice care network.

## 2017-11-28 NOTE — PROGRESS NOTE ADULT - SUBJECTIVE AND OBJECTIVE BOX
CC: F/U for respiratory distress    SUBJECTIVE / OVERNIGHT EVENTS:  No significant events overnight.  Unable to make needs known.    MEDICATIONS  (STANDING):  ALBUTerol/ipratropium for Nebulization 3 milliLiter(s) Nebulizer every 4 hours  aztreonam  IVPB 1000 milliGRAM(s) IV Intermittent every 8 hours  aztreonam  IVPB      metroNIDAZOLE  IVPB 500 milliGRAM(s) IV Intermittent every 8 hours  metroNIDAZOLE  IVPB      pantoprazole    Tablet 40 milliGRAM(s) Oral before breakfast  valproic  acid Syrup 250 milliGRAM(s) Oral every 12 hours    MEDICATIONS  (PRN):  morphine  - Injectable 1 milliGRAM(s) IV Push every 4 hours PRN SOB      Vital Signs Last 24 Hrs  T(C): 37.6 (28 Nov 2017 05:11), Max: 37.6 (28 Nov 2017 05:11)  T(F): 99.6 (28 Nov 2017 05:11), Max: 99.6 (28 Nov 2017 05:11)  HR: 93 (28 Nov 2017 13:17) (64 - 100)  BP: 130/77 (28 Nov 2017 05:11) (130/77 - 133/77)  BP(mean): --  RR: 31 (28 Nov 2017 13:15) (19 - 31)  SpO2: 98% (28 Nov 2017 13:17) (97% - 100%)  CAPILLARY BLOOD GLUCOSE      POCT Blood Glucose.: 95 mg/dL (27 Nov 2017 22:00)    I&O's Summary      PHYSICAL EXAM:  GENERAL: NAD, not responsive to verbal stimuli.  HEAD:  Atraumatic, Normocephalic, BiPAP mask in place.  EYES: EOMI, PERRLA, conjunctiva and sclera clear  NECK: Supple, No JVD  CHEST/LUNG: Coarse breath sounds due to BiPAP.    HEART: Regular rate and rhythm; No murmurs, rubs, or gallops  ABDOMEN: Soft, Nontender, Nondistended; Bowel sounds present  EXTREMITIES:  2+ Peripheral Pulses, No clubbing, cyanosis, or edema  PSYCH: Calm  NEUROLOGY: Responds to noxious stimuli.  SKIN: No rashes or lesions    LABS:                        11.1   8.41  )-----------( 182      ( 28 Nov 2017 05:14 )             34.8     11-28    142  |  100  |  14  ----------------------------<  92  4.5   |  31  |  0.39<L>    Ca    8.3<L>      28 Nov 2017 05:14      PT/INR - ( 27 Nov 2017 07:09 )   PT: 14.1 SEC;   INR: 1.25                    RADIOLOGY & ADDITIONAL TESTS:    Imaging Personally Reviewed:    Care Discussed with Consultants/Other Providers:

## 2017-11-28 NOTE — PROGRESS NOTE ADULT - PROBLEM SELECTOR PLAN 1
Patient on continuous BiPAP, he is DNR/DNI per family's wishes.  Dr. Lane spoke with family regarding worsening respiratory status and increasing oxygen requirements.  Wife and daughter would like to focus on patient's comfort.  Primary  team to wean off BiPAP, family aware that patient may not survive this.  Morphine PRN discussed to relieve sx of dyspnea/SOB - family agreeable.

## 2017-11-28 NOTE — PROGRESS NOTE ADULT - SUBJECTIVE AND OBJECTIVE BOX
INTERVAL HPI/OVERNIGHT EVENTS:  Patient remains on continuous BiPAP, still appears dyspneic.  He remains altered, non-verbal.  Wife and daughter are at bedside.  See goals of care notes below.    Allergies    penicillins (Unknown)    Intolerances      ADVANCE DIRECTIVES:    DNR: [x] YES [ ] NO           PRESENT SYMPTOMS:   SOURCE:  [ ] Patient   [x] Family   [x] Team     Pain: None    Dyspnea:  [x] YES [ ] NO  Anxiety:  [ ] YES [x] NO  Fatigue: [x YES [ ] NO  Nausea: [ ] YES [x] NO  Loss of Appetite: [x] YES [ ] NO  Constipation [ ] YES   [x] No     OTHER SYMPTOMS:  [ ] All other ROS negative     [x] Unable to obtain due to poor mentation    MEDICATIONS  (STANDING):  ALBUTerol/ipratropium for Nebulization 3 milliLiter(s) Nebulizer every 4 hours  aztreonam  IVPB 1000 milliGRAM(s) IV Intermittent every 8 hours  aztreonam  IVPB      metroNIDAZOLE  IVPB 500 milliGRAM(s) IV Intermittent every 8 hours  metroNIDAZOLE  IVPB      pantoprazole    Tablet 40 milliGRAM(s) Oral before breakfast  valproic  acid Syrup 250 milliGRAM(s) Oral every 12 hours    MEDICATIONS  (PRN):  morphine  - Injectable 1 milliGRAM(s) IV Push every 4 hours PRN SOB    Karnofsky Performance Score/Palliative Performance Status Version 2:         10%  Protein Calorie Malnutrition:  [x] Mild   [ ] Moderate   [ ] Severe     Physical Exam:    General: [ ] Alert,  A&O x     [x] lethargic   [ ] Agitated   [ ] Cachexia   HEENT: [ ] Normal   [x] Dry mouth   [ ] ET Tube    [ ] Trach   Lungs: [ ] Clear [x] Rhonchi  [ ] Crackles [ ] Wheezing [x] Tachypnea  [ ] Audible excessive secretions   Cardiovascular:  [x] Regular rate and rhythm  [ ] Irregular [ ] Tachycardia   [ ] Bradycardia   Abdomen:  [x] Soft  [ ] Distended  [ ]  [ ] +BS  [ ] Non tender [ ] Tender  [ ]PEG   [ ] NGT   Last BM:     Genitourinary:  [ ] Normal [x] Incontinent   [ ] Oliguria/Anuria   [ ] Kirkpatrick  Musculoskeletal:  [ ] Normal   [x] Generalized weakness  [x] Bedbound   Neurological: [ ] No focal deficits  [x] Cognitive impairment     Skin: [x] Normal   [ ] Pressure ulcers     Vital Signs Last 24 Hrs  T(C): 37.6 (28 Nov 2017 05:11), Max: 37.6 (28 Nov 2017 05:11)  T(F): 99.6 (28 Nov 2017 05:11), Max: 99.6 (28 Nov 2017 05:11)  HR: 96 (28 Nov 2017 11:00) (64 - 100)  BP: 130/77 (28 Nov 2017 05:11) (130/77 - 133/77)  BP(mean): --  RR: 26 (28 Nov 2017 05:11) (19 - 26)  SpO2: 97% (28 Nov 2017 11:00) (97% - 100%)    LABS:                        11.1   8.41  )-----------( 182      ( 28 Nov 2017 05:14 )             34.8     11-28    142  |  100  |  14  ----------------------------<  92  4.5   |  31  |  0.39<L>    Ca    8.3<L>      28 Nov 2017 05:14      PT/INR - ( 27 Nov 2017 07:09 )   PT: 14.1 SEC;   INR: 1.25       I&O's Summary      RADIOLOGY & ADDITIONAL STUDIES: none new. INTERVAL HPI/OVERNIGHT EVENTS:  Patient remains on continuous BiPAP, still appears dyspneic.  He remains altered, non-verbal.  Wife and daughter are at bedside.  See goals of care note below.    Allergies    penicillins (Unknown)    Intolerances    ADVANCE DIRECTIVES:    DNR: [x] YES [ ] NO           PRESENT SYMPTOMS:   SOURCE:  [ ] Patient   [x] Family   [x] Team     Pain: None    Dyspnea:  [x] YES [ ] NO  Anxiety:  [ ] YES [x] NO  Fatigue: [x YES [ ] NO  Nausea: [ ] YES [x] NO  Loss of Appetite: [x] YES [ ] NO  Constipation [ ] YES   [x] No     OTHER SYMPTOMS:  [ ] All other ROS negative     [x] Unable to obtain due to poor mentation    MEDICATIONS  (STANDING):  ALBUTerol/ipratropium for Nebulization 3 milliLiter(s) Nebulizer every 4 hours  aztreonam  IVPB 1000 milliGRAM(s) IV Intermittent every 8 hours  aztreonam  IVPB      metroNIDAZOLE  IVPB 500 milliGRAM(s) IV Intermittent every 8 hours  metroNIDAZOLE  IVPB      pantoprazole    Tablet 40 milliGRAM(s) Oral before breakfast  valproic  acid Syrup 250 milliGRAM(s) Oral every 12 hours    MEDICATIONS  (PRN):  morphine  - Injectable 1 milliGRAM(s) IV Push every 4 hours PRN SOB    Karnofsky Performance Score/Palliative Performance Status Version 2:         10%  Protein Calorie Malnutrition:  [x] Mild   [ ] Moderate   [ ] Severe     Physical Exam:    General: [ ] Alert,  A&O x     [x] lethargic   [ ] Agitated   [ ] Cachexia   HEENT: [ ] Normal   [x] Dry mouth   [ ] ET Tube    [ ] Trach   Lungs: [ ] Clear [x] Rhonchi  [ ] Crackles [ ] Wheezing [x] Tachypnea  [ ] Audible excessive secretions   Cardiovascular:  [x] Regular rate and rhythm  [ ] Irregular [ ] Tachycardia   [ ] Bradycardia   Abdomen:  [x] Soft  [ ] Distended  [ ]  [ ] +BS  [ ] Non tender [ ] Tender  [ ]PEG   [ ] NGT   Last BM:     Genitourinary:  [ ] Normal [x] Incontinent   [ ] Oliguria/Anuria   [ ] Kirkpatrick  Musculoskeletal:  [ ] Normal   [x] Generalized weakness  [x] Bedbound   Neurological: [ ] No focal deficits  [x] Cognitive impairment     Skin: [x] Normal   [ ] Pressure ulcers     Vital Signs Last 24 Hrs  T(C): 37.6 (28 Nov 2017 05:11), Max: 37.6 (28 Nov 2017 05:11)  T(F): 99.6 (28 Nov 2017 05:11), Max: 99.6 (28 Nov 2017 05:11)  HR: 96 (28 Nov 2017 11:00) (64 - 100)  BP: 130/77 (28 Nov 2017 05:11) (130/77 - 133/77)  BP(mean): --  RR: 26 (28 Nov 2017 05:11) (19 - 26)  SpO2: 97% (28 Nov 2017 11:00) (97% - 100%)    LABS:                        11.1   8.41  )-----------( 182      ( 28 Nov 2017 05:14 )             34.8     11-28    142  |  100  |  14  ----------------------------<  92  4.5   |  31  |  0.39<L>    Ca    8.3<L>      28 Nov 2017 05:14      PT/INR - ( 27 Nov 2017 07:09 )   PT: 14.1 SEC;   INR: 1.25       I&O's Summary      RADIOLOGY & ADDITIONAL STUDIES: none new.

## 2017-11-28 NOTE — PROVIDER CONTACT NOTE (OTHER) - BACKGROUND
Patient admitted with influenza
Patient admitted for Pneumonia. Hx of CVA, right hemiparesis, a-fib.
Patient admitted with influenza
Patient admitted with influenza
Patient admitted with influenza.
Patient admitted with influenza

## 2017-11-28 NOTE — PROVIDER CONTACT NOTE (OTHER) - NAME OF MD/NP/PA/DO NOTIFIED:
EVELYN Narayan
Khurram Churchill
Khurram Churchill, ADS
Khurram Churchill, ADS
Helene, NP
Khurram Churchill, ADS
Khurram Churchill, ADS

## 2017-11-28 NOTE — PROVIDER CONTACT NOTE (OTHER) - ACTION/TREATMENT ORDERED:
none
Provider wants patient to remain NPO with no tube feedings while patient is on continuos bipap until further notice.
Ok to hold medication
Put patient on continuous bipap
Administer 2 doses of 1mg IV push morphine for tachypnea, increased high flow oxygen to 100% and reassess in an hour
Ok to hold medication
continue to monitor

## 2017-11-28 NOTE — PROVIDER CONTACT NOTE (OTHER) - ASSESSMENT
Patient is asymptomatic.
Patient appeared hypoxic and he was visibly using his accessory muscles to breath.
Patient is asymptomatic
Patient is hypoxic and tachycardic and appears to be in respiratory distress
VS 99.7 temp, 88 HR, 84/56 BP, 20 resp 95% O2 with NC @2 lpm.  Patient is on NS @100ml/hr.
Patient has been NPO since midnight

## 2017-11-28 NOTE — PROGRESS NOTE ADULT - PROBLEM SELECTOR PLAN 5
See [facility] discussion below.  Primary team and palliative team offered emotional support and services.  Pt's family declined chaplaincy referral for now. See GOC discussion below.  Primary team and palliative team offered emotional support and services.  Pt's family declined chaplaincy referral for now

## 2017-11-29 ENCOUNTER — TRANSCRIPTION ENCOUNTER (OUTPATIENT)
Age: 82
End: 2017-11-29

## 2017-11-29 PROCEDURE — 99233 SBSQ HOSP IP/OBS HIGH 50: CPT

## 2017-11-29 PROCEDURE — 99233 SBSQ HOSP IP/OBS HIGH 50: CPT | Mod: GC

## 2017-11-29 RX ORDER — MORPHINE SULFATE 50 MG/1
10 CAPSULE, EXTENDED RELEASE ORAL
Qty: 1 | Refills: 0 | OUTPATIENT
Start: 2017-11-29 | End: 2017-12-29

## 2017-11-29 RX ORDER — MORPHINE SULFATE 50 MG/1
10 CAPSULE, EXTENDED RELEASE ORAL
Qty: 30 | Refills: 0 | OUTPATIENT
Start: 2017-11-29 | End: 2017-12-29

## 2017-11-29 RX ORDER — WARFARIN SODIUM 2.5 MG/1
1 TABLET ORAL
Qty: 0 | Refills: 0 | COMMUNITY

## 2017-11-29 RX ORDER — OMEPRAZOLE 10 MG/1
1 CAPSULE, DELAYED RELEASE ORAL
Qty: 0 | Refills: 0 | COMMUNITY

## 2017-11-29 RX ORDER — MORPHINE SULFATE 50 MG/1
10 CAPSULE, EXTENDED RELEASE ORAL
Qty: 5 | Refills: 0 | OUTPATIENT
Start: 2017-11-29 | End: 2017-12-29

## 2017-11-29 RX ORDER — MORPHINE SULFATE 50 MG/1
5 CAPSULE, EXTENDED RELEASE ORAL
Qty: 0 | Refills: 0 | Status: DISCONTINUED | OUTPATIENT
Start: 2017-11-29 | End: 2017-11-30

## 2017-11-29 RX ORDER — SENNA PLUS 8.6 MG/1
5 TABLET ORAL
Qty: 0 | Refills: 0 | COMMUNITY

## 2017-11-29 RX ORDER — DOCUSATE SODIUM 100 MG
25 CAPSULE ORAL
Qty: 0 | Refills: 0 | COMMUNITY

## 2017-11-29 RX ADMIN — Medication 100 MILLIGRAM(S): at 06:32

## 2017-11-29 RX ADMIN — Medication 50 MILLIGRAM(S): at 07:45

## 2017-11-29 RX ADMIN — Medication 100 MILLIGRAM(S): at 14:48

## 2017-11-29 RX ADMIN — Medication 50 MILLIGRAM(S): at 17:06

## 2017-11-29 RX ADMIN — MORPHINE SULFATE 1 MILLIGRAM(S): 50 CAPSULE, EXTENDED RELEASE ORAL at 00:39

## 2017-11-29 RX ADMIN — Medication 3 MILLILITER(S): at 01:00

## 2017-11-29 RX ADMIN — Medication 3 MILLILITER(S): at 09:31

## 2017-11-29 RX ADMIN — Medication 3 MILLILITER(S): at 23:29

## 2017-11-29 RX ADMIN — Medication 50 MILLIGRAM(S): at 23:13

## 2017-11-29 RX ADMIN — Medication 3 MILLILITER(S): at 05:19

## 2017-11-29 RX ADMIN — Medication 3 MILLILITER(S): at 17:15

## 2017-11-29 RX ADMIN — Medication 100 MILLIGRAM(S): at 21:45

## 2017-11-29 RX ADMIN — Medication 3 MILLILITER(S): at 13:23

## 2017-11-29 NOTE — PROGRESS NOTE ADULT - ASSESSMENT
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 aspiration PNA.  He is DNR/DNI.  Palliative Care consult to discuss GOC.

## 2017-11-29 NOTE — DISCHARGE NOTE ADULT - CARE PLAN
Principal Discharge DX:	Aspiration pneumonia  Goal:	abx  Instructions for follow-up, activity and diet:	Antibiotics were given while in hospital.  Secondary Diagnosis:	Acute respiratory failure with hypoxia  Instructions for follow-up, activity and diet:	Symptom control with morphine.  Secondary Diagnosis:	Atrial fibrillation, unspecified type  Instructions for follow-up, activity and diet:	Discontinue Coumadin as risk from from A/C far outweighs the benefit at this time.  Secondary Diagnosis:	Cerebrovascular accident (CVA), unspecified mechanism  Instructions for follow-up, activity and diet:	Discontinue ASA, statins as it provides no immediate clinical benefit.  Secondary Diagnosis:	Seizure disorder  Instructions for follow-up, activity and diet:	Continue depakene.  Secondary Diagnosis:	Dysphagia  Instructions for follow-up, activity and diet:	PEG in place.  Feeding on hold due to aspiration.

## 2017-11-29 NOTE — DISCHARGE NOTE ADULT - DURABLE MEDICAL EQUIPMENT AGENCY
[facility] [phone] AIDE to meet patient at 3pm in the hospital on 11/30

## 2017-11-29 NOTE — PROGRESS NOTE ADULT - SUBJECTIVE AND OBJECTIVE BOX
INTERVAL HPI/OVERNIGHT EVENTS:  Patient remains on BiPAP, but appears to be in acute distress. He remains altered, non-verbal.  Wife and son are at bedside.     Allergies    penicillins (Unknown)    Intolerances    ADVANCE DIRECTIVES:    DNR: [x] YES [ ] NO           PRESENT SYMPTOMS:   SOURCE:  [ ] Patient   [x] Family   [x] Team     Pain: None    Dyspnea:  [x] YES [ ] NO  Anxiety:  [ ] YES [x] NO  Fatigue: [x YES [ ] NO  Nausea: [ ] YES [x] NO  Loss of Appetite: [x] YES [ ] NO  Constipation [ ] YES   [x] No     OTHER SYMPTOMS:  [ ] All other ROS negative     [x] Unable to obtain due to poor mentation    MEDICATIONS  (STANDING):  ALBUTerol/ipratropium for Nebulization 3 milliLiter(s) Nebulizer every 4 hours  aztreonam  IVPB 1000 milliGRAM(s) IV Intermittent every 8 hours  aztreonam  IVPB      metroNIDAZOLE  IVPB 500 milliGRAM(s) IV Intermittent every 8 hours  metroNIDAZOLE  IVPB      valproic  acid Syrup 250 milliGRAM(s) Oral every 12 hours    MEDICATIONS  (PRN):  morphine  - Injectable 1 milliGRAM(s) IV Push every 4 hours PRN SOB    Karnofsky Performance Score/Palliative Performance Status Version 2:         10%  Protein Calorie Malnutrition:  [x] Mild   [ ] Moderate   [ ] Severe     Physical Exam:    General: [ ] Alert,  A&O x     [x] lethargic   [ ] Agitated   [ ] Cachexia   HEENT: [ ] Normal   [x] Dry mouth   [ ] ET Tube    [ ] Trach   Lungs: [ ] Clear [x] Rhonchi  [ ] Crackles [ ] Wheezing [x] Tachypnea  [ ] Audible excessive secretions   Cardiovascular:  [x] Regular rate and rhythm  [ ] Irregular [ ] Tachycardia   [ ] Bradycardia   Abdomen:  [x] Soft  [ ] Distended  [ ]  [ ] +BS  [ ] Non tender [ ] Tender  [ ]PEG   [ ] NGT   Last BM:     Genitourinary:  [ ] Normal [x] Incontinent   [ ] Oliguria/Anuria   [ ] Kirkpatrick  Musculoskeletal:  [ ] Normal   [x] Generalized weakness  [x] Bedbound   Neurological: [ ] No focal deficits  [x] Cognitive impairment     Skin: [x] Normal   [ ] Pressure ulcers     Vital Signs Last 24 Hrs  T(C): 36.9 (29 Nov 2017 04:18), Max: 37.3 (28 Nov 2017 14:00)  T(F): 98.4 (29 Nov 2017 04:18), Max: 99.1 (28 Nov 2017 14:00)  HR: 90 (29 Nov 2017 11:22) (88 - 98)  BP: 120/83 (29 Nov 2017 04:18) (115/79 - 146/76)  BP(mean): --  RR: 19 (29 Nov 2017 04:18) (19 - 31)  SpO2: 97% (29 Nov 2017 11:22) (92% - 99%)  LABS:                        11.1   8.41  )-----------( 182      ( 28 Nov 2017 05:14 )             34.8     11-28    142  |  100  |  14  ----------------------------<  92  4.5   |  31  |  0.39<L>    Ca    8.3<L>      28 Nov 2017 05:14      PT/INR - ( 27 Nov 2017 07:09 )   PT: 14.1 SEC;   INR: 1.25       I&O's Summary    RADIOLOGY & ADDITIONAL STUDIES: none new. INTERVAL HPI/OVERNIGHT EVENTS:  Patient remains on BiPAP, but appears to be in no acute distress. He remains altered, non-verbal.  Wife and son are at bedside.     Allergies    penicillins (Unknown)    Intolerances    ADVANCE DIRECTIVES:    DNR: [x] YES [ ] NO           PRESENT SYMPTOMS:   SOURCE:  [ ] Patient   [x] Family   [x] Team     Pain: None    Dyspnea:  [x] YES [ ] NO  Anxiety:  [ ] YES [x] NO  Fatigue: [x YES [ ] NO  Nausea: [ ] YES [x] NO  Loss of Appetite: [x] YES [ ] NO  Constipation [ ] YES   [x] No     OTHER SYMPTOMS:  [ ] All other ROS negative     [x] Unable to obtain due to poor mentation    MEDICATIONS  (STANDING):  ALBUTerol/ipratropium for Nebulization 3 milliLiter(s) Nebulizer every 4 hours  aztreonam  IVPB 1000 milliGRAM(s) IV Intermittent every 8 hours  aztreonam  IVPB      metroNIDAZOLE  IVPB 500 milliGRAM(s) IV Intermittent every 8 hours  metroNIDAZOLE  IVPB      valproic  acid Syrup 250 milliGRAM(s) Oral every 12 hours    MEDICATIONS  (PRN):  morphine   Solution 5 milliGRAM(s) Oral every 3 hours PRN dyspnea        Karnofsky Performance Score/Palliative Performance Status Version 2:         10%  Protein Calorie Malnutrition:  [x] Mild   [ ] Moderate   [ ] Severe     Physical Exam:    General: [ ] Alert,  A&O x     [x] lethargic   [ ] Agitated   [ ] Cachexia   HEENT: [ ] Normal   [x] Dry mouth   [ ] ET Tube    [ ] Trach   Lungs: [ ] Clear [x] Rhonchi  [ ] Crackles [ ] Wheezing [x] Tachypnea  [ ] Audible excessive secretions   Cardiovascular:  [x] Regular rate and rhythm  [ ] Irregular [ ] Tachycardia   [ ] Bradycardia   Abdomen:  [x] Soft  [ ] Distended  [ ]  [ ] +BS  [ ] Non tender [ ] Tender  [ ]PEG   [ ] NGT   Last BM:     Genitourinary:  [ ] Normal [x] Incontinent   [ ] Oliguria/Anuria   [ ] Kirkpatrick  Musculoskeletal:  [ ] Normal   [x] Generalized weakness  [x] Bedbound   Neurological: [ ] No focal deficits  [x] Cognitive impairment     Skin: [x] Normal   [ ] Pressure ulcers     Vital Signs Last 24 Hrs  T(C): 36.9 (29 Nov 2017 04:18), Max: 37.3 (28 Nov 2017 14:00)  T(F): 98.4 (29 Nov 2017 04:18), Max: 99.1 (28 Nov 2017 14:00)  HR: 90 (29 Nov 2017 11:22) (88 - 98)  BP: 120/83 (29 Nov 2017 04:18) (115/79 - 146/76)  BP(mean): --  RR: 19 (29 Nov 2017 04:18) (19 - 31)  SpO2: 97% (29 Nov 2017 11:22) (92% - 99%)  LABS:                        11.1   8.41  )-----------( 182      ( 28 Nov 2017 05:14 )             34.8     11-28    142  |  100  |  14  ----------------------------<  92  4.5   |  31  |  0.39<L>    Ca    8.3<L>      28 Nov 2017 05:14      PT/INR - ( 27 Nov 2017 07:09 )   PT: 14.1 SEC;   INR: 1.25       I&O's Summary    RADIOLOGY & ADDITIONAL STUDIES: none new.

## 2017-11-29 NOTE — DISCHARGE NOTE ADULT - CARE PROVIDER_API CALL
[facility] care network,   Phone: (   )    -  Fax: (   )    -

## 2017-11-29 NOTE — PROGRESS NOTE ADULT - PROBLEM SELECTOR PLAN 1
Continue Aztreonam and Flagyl IV.  Will discontinue BiPAP as per family wishes once patient goes home hospice. DNR/DNI.  Poor prognosis.

## 2017-11-29 NOTE — PROGRESS NOTE ADULT - PROBLEM SELECTOR PLAN 1
We discussed patient's respiratory status with Dr. Lane, pt's family, and hospice liaison today.  He will remain on BiPAP and be discharged home on BiPAP likely tomorrow.  DNR/DNI.   - Continue Morphine 1mg PRN dyspnea (has used 2 doses in last 24 hours) Dr. Lane, Yen Duncan, and myself met with family at bedside today.  Pt will remain on BiPAP and be discharged home on BiPAP likely tomorrow.  DNR/DNI.   - Continue Morphine 1mg PRN dyspnea (has used 2 doses in last 24 hours) and transition to Morphine solution 5mg q3 PRN dyspnea Dr. Lane, Yen Duncan, and myself met with family at bedside today.  Pt will remain on BiPAP and be discharged home on BiPAP likely tomorrow.  DNR/DNI.   - Continue Morphine 1mg PRN dyspnea (has used 2 doses in last 24 hours) and transition to Morphine solution 5mg  q3 PRN dyspnea

## 2017-11-29 NOTE — DISCHARGE NOTE ADULT - SECONDARY DIAGNOSIS.
Acute respiratory failure with hypoxia Atrial fibrillation, unspecified type Cerebrovascular accident (CVA), unspecified mechanism Seizure disorder Dysphagia

## 2017-11-29 NOTE — DISCHARGE NOTE ADULT - PATIENT PORTAL LINK FT
“You can access the [facility] Patient Portal, offered by [facility], by registering with the following website: [email or web address]”

## 2017-11-29 NOTE — DISCHARGE NOTE ADULT - PLAN OF CARE
abx Antibiotics were given while in hospital. Symptom control with morphine. Discontinue Coumadin as risk from from A/C far outweighs the benefit at this time. Discontinue ASA, statins as it provides no immediate clinical benefit. Continue depakene. PEG in place.  Feeding on hold due to aspiration.

## 2017-11-29 NOTE — PROGRESS NOTE ADULT - SUBJECTIVE AND OBJECTIVE BOX
CC: F/U for respiratory distress.    SUBJECTIVE / OVERNIGHT EVENTS:  Patient unable to make needs known.  Minimally responsive.     MEDICATIONS  (STANDING):  ALBUTerol/ipratropium for Nebulization 3 milliLiter(s) Nebulizer every 4 hours  aztreonam  IVPB 1000 milliGRAM(s) IV Intermittent every 8 hours  aztreonam  IVPB      metroNIDAZOLE  IVPB 500 milliGRAM(s) IV Intermittent every 8 hours  metroNIDAZOLE  IVPB      valproic  acid Syrup 250 milliGRAM(s) Oral every 12 hours    MEDICATIONS  (PRN):  morphine   Solution 5 milliGRAM(s) Oral every 3 hours PRN dyspnea      Vital Signs Last 24 Hrs  T(C): 37.7 (29 Nov 2017 14:29), Max: 37.7 (29 Nov 2017 14:29)  T(F): 99.9 (29 Nov 2017 14:29), Max: 99.9 (29 Nov 2017 14:29)  HR: 98 (29 Nov 2017 14:29) (88 - 101)  BP: 118/76 (29 Nov 2017 14:29) (115/79 - 146/76)  BP(mean): --  RR: 18 (29 Nov 2017 14:29) (18 - 26)  SpO2: 98% (29 Nov 2017 14:29) (92% - 100%)  CAPILLARY BLOOD GLUCOSE        I&O's Summary      PHYSICAL EXAM:  GENERAL: NAD, not responsive to verbal stimuli.  HEAD:  Atraumatic, Normocephalic, BiPAP mask in place.  EYES: EOMI, PERRLA, conjunctiva and sclera clear  NECK: Supple, No JVD  CHEST/LUNG: Coarse breath sounds due to BiPAP.    HEART: Regular rate and rhythm; No murmurs, rubs, or gallops  ABDOMEN: Soft, Nontender, Nondistended; Bowel sounds present  EXTREMITIES:  2+ Peripheral Pulses, No clubbing, cyanosis, or edema  PSYCH: Calm  NEUROLOGY: Responds to noxious stimuli.  SKIN: No rashes or lesions    LABS:                        11.1   8.41  )-----------( 182      ( 28 Nov 2017 05:14 )             34.8     11-28    142  |  100  |  14  ----------------------------<  92  4.5   |  31  |  0.39<L>    Ca    8.3<L>      28 Nov 2017 05:14                RADIOLOGY & ADDITIONAL TESTS:    Imaging Personally Reviewed:    Care Discussed with Consultants/Other Providers:

## 2017-11-29 NOTE — PROGRESS NOTE ADULT - PROBLEM SELECTOR PLAN 5
Family very clear about their goals and wishes - wife wants pt to go home with hospice.  They understand that they will be administering morphine at home.  They also understand that when BIPAP is removed, pt may  within hours to days.  Hospice Care  and  are coordinating patient's home care. Family very clear about their goals and wishes - wife wants pt to go home with hospice.  They understand that they will be administering morphine at home via PEG.  They also understand that when BIPAP is removed, pt may  within hours to days.  Hospice Care  and  are coordinating patient's home care.

## 2017-11-29 NOTE — DISCHARGE NOTE ADULT - HOSPITAL COURSE
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     hospital course:      Acute respiratory failure with hypoxia and hypercapnia.  -MICU Cx - continue with IVF  -Likely 2/2 aspiration event in setting of underlying URI/PNA  -RVP positive for Parainfluenza  -Clindamycin (11/19    -BCx negative, UCx negative, CXR negative   -High flow O2 (pt. on home O2) -- maintain >92%  -DNR/DNI as per ED discussion with son  -Aspiration precautions   Hypotension -OK to give IV fluids, PO Midodrine. no  IV pressors   11/22 - IVF D/C - mild dyspnea noted with crackles on exam ------------- monitor BP ---------  11/23: 11/23: labored breathing - chest xray- not much change , duonebs ordered prn, broadened abx - possible aspiration pna  11/26-RRT called. High Flow, Vanco 1 gm given, Flagyl started, poss asp Pn  11/26-Palliative c/s -High flow O2 keep oxygen level above 90%  - plan for home hospice with bipap       Dysphagia  - patient with PEG tube  - family requesting change 2/2 discolored tubing -   - GI consulted -   -planned PEG exchange possible for Monday, GI to evaluate if patient stable.  -Coumadin on hold    Metabolic encephalopathy.   -Mental status poor at baseline, pt able to move eyes and recognize family  -Now with unresponsiveness, likely 2/2 aspiration event    CVA  -Remote Hx c/b contration and dysphagia now S/P PEG  -PEG tube feeds  -Coumadin daily for prophylaxis     Atrial fibrillation  -Tachycardic on admission, now HR WNL  -Coumadin daily   -coumadin on hold until after PEG exchange  	- 11/24: INR< 2, as per attending no need for iv heparin now -     Seizure disorder.   -Valproic acid subtherapeutic   -Discussed with pharmacy, change dose from 250 qD to 500 qD via PEG.     Goals of care, counseling/discussion.   -DNR/DNI  -No invasive/aggressive measures  -Offered chaplaincy, emotional support provided to family, would be amenable to .    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     hospital course:      Acute respiratory failure with hypoxia and hypercapnia.  -MICU Cx - continue with IVF  -Likely 2/2 aspiration event in setting of underlying URI/PNA  -RVP positive for Parainfluenza  -Clindamycin (    -BCx negative, UCx negative, CXR negative   -High flow O2 (pt. on home O2) -- maintain >92%  -DNR/DNI as per ED discussion with son  -Aspiration precautions   Hypotension -OK to give IV fluids, PO Midodrine. no  IV pressors    - IVF D/C - mild dyspnea noted with crackles on exam. monitor BP   : : labored breathing - chest xray- not much change , duonebs ordered prn, broadened abx - possible aspiration pna  -RRT called. High Flow, Vanco 1 gm given, Flagyl started, poss asp Pn  -Palliative c/s -High flow O2 keep oxygen level above 90%  - plan for home hospice with bipap       Dysphagia  - patient with PEG tube  - family requesting change 2/2 discolored tubing -   - GI consulted -   -planned PEG exchange possible for Monday, GI to evaluate if patient stable.  -Coumadin on hold    Metabolic encephalopathy.   -Mental status poor at baseline, pt able to move eyes and recognize family  -Now with unresponsiveness, likely 2/2 aspiration event    CVA  -Remote Hx c/b contration and dysphagia now S/P PEG  -PEG tube feeds  -Coumadin daily for prophylaxis     Atrial fibrillation  -Tachycardic on admission, now HR WNL  -Coumadin daily   -coumadin on hold until after PEG exchange  - : INR< 2, as per attending no need for iv heparin now -   -hold coumadin as risks outweigh benefits     Seizure disorder.   -Valproic acid subtherapeutic   -Discussed with pharmacy, change dose from 250 qD to 500 qD via PEG.     Goals of care, counseling/discussion.   -DNR/DNI  -No invasive/aggressive measures  -Offered chaplaincy, emotional support provided to family, would be amenable to .  -Family very clear about their goals and wishes - wife wants pt to go home with hospice.  They understand that they will be administering morphine at home via PEG.  They also understand that when BIPAP is removed, pt may  within hours to days.  Hospice Care  and  are coordinating patient's home care.     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     hospital course:      Acute respiratory failure with hypoxia and hypercapnia.  -MICU Cx - continue with IVF  -Likely 2/2 aspiration event in setting of underlying URI/PNA  -RVP positive for Parainfluenza  -Clindamycin (    -BCx negative, UCx negative, CXR negative   -High flow O2 (pt. on home O2) -- maintain >92%  -DNR/DNI as per ED discussion with son  -Aspiration precautions   Hypotension -OK to give IV fluids, PO Midodrine. no  IV pressors    - IVF D/C - mild dyspnea noted with crackles on exam. monitor BP   : : labored breathing - chest xray- not much change , duonebs ordered prn, broadened abx - possible aspiration pna  -RRT called. High Flow, Vanco 1 gm given, Flagyl started, poss asp Pn  -Palliative c/s -High flow O2 keep oxygen level above 90%  - plan for home hospice with bipap and morphine prn at home       Dysphagia  - patient with PEG tube  - family requesting change 2/2 discolored tubing -   - GI consulted -   -planned PEG exchange possible for Monday, GI to evaluate if patient stable.  -feeds on hold pt at risk for aspiration   -Coumadin on hold    Metabolic encephalopathy.   -Mental status poor at baseline, pt able to move eyes and recognize family  -Now with unresponsiveness, likely 2/2 aspiration event    CVA  -Remote Hx c/b contration and dysphagia now S/P PEG  -PEG tube feeds  -Coumadin daily for prophylaxis     Atrial fibrillation  -Tachycardic on admission, now HR WNL  -Coumadin daily   -coumadin on hold until after PEG exchange  - : INR< 2, as per attending no need for iv heparin now -   -hold coumadin as risks outweigh benefits     Seizure disorder.   -Valproic acid subtherapeutic   -Discussed with pharmacy, change dose from 250 qD to 500 qD via PEG.     Goals of care, counseling/discussion.   -DNR/DNI  -No invasive/aggressive measures  -Offered chaplaincy, emotional support provided to family, would be amenable to .  -Family very clear about their goals and wishes - wife wants pt to go home with hospice.  They understand that they will be administering morphine at home via PEG.  They also understand that when BIPAP is removed, pt may  within hours to days.  Hospice [facility]  and  are coordinating patient's home care.     Functional quadriplegia   -Assistance with ADLs     Dispo Home w/ hospice

## 2017-11-29 NOTE — CHART NOTE - NSCHARTNOTEFT_GEN_A_CORE
Source: Patient [ ]    Family [ ]     other [x] Medical record reviewed. Spoke to RN.   Patient seen for nutrition length of stay follow-up. S/p swallow assessment (11/22) - PO nutrition contraindicated, continue with PEG as source of nutrition. Per GOC discussion (11/28), patient with poor prognosis. Currently on continuous Bipap due to worsening respiratory status. Patient likely to be discharged on Bipap. Plan to d/c to home hospice.     Diet : NPO with tube feeds  Enteral /Parenteral Nutrition:     Current Weight: no new weights recorded. 173# (11/20)    Pertinent Medications: MEDICATIONS  (STANDING):  ALBUTerol/ipratropium for Nebulization 3 milliLiter(s) Nebulizer every 4 hours  aztreonam  IVPB 1000 milliGRAM(s) IV Intermittent every 8 hours  aztreonam  IVPB      metroNIDAZOLE  IVPB 500 milliGRAM(s) IV Intermittent every 8 hours  metroNIDAZOLE  IVPB      valproic  acid Syrup 250 milliGRAM(s) Oral every 12 hours    MEDICATIONS  (PRN):  morphine   Solution 5 milliGRAM(s) Oral every 3 hours PRN dyspnea    Pertinent Labs: reviewed, none to note.  Edema: generalized 2+ edema.  Skin: no pressure ulcers recorded.    Estimated Needs:   [X] no change since previous assessment  [ ] recalculated:     *Medical condition precludes nutrition intervention.  RD to remain available, Julieta Fleming RD, CDN

## 2017-11-29 NOTE — DISCHARGE NOTE ADULT - MEDICATION SUMMARY - MEDICATIONS TO TAKE
I will START or STAY ON the medications listed below when I get home from the hospital:    morphine 20 mg/mL oral concentrate  -- 0.25 milliliter(s) by mouth every 2 hours  prn for SOB MDD:12   -- Caution federal law prohibits the transfer of this drug to any person other  than the person for whom it was prescribed.  Dilute this medication with liquid before administration.  May cause drowsiness.  Alcohol may intensify this effect.  Use care when operating dangerous machinery.  This prescription cannot be refilled.  Using more of this medication than prescribed may cause serious breathing problems.    -- Indication: For Goals of care, counseling/discussion    valproic acid 250 mg/5 mL oral syrup  -- 5 milliliter(s) by gastrostomy tube once a day  -- Indication: For Seizure disorder    mirtazapine 15 mg oral tablet  -- 1 tab(s) by gastrostomy tube once a day (at bedtime)  -- Indication: For Need for prophylactic measure    risperiDONE 0.5 mg oral tablet  -- 1 tab(s) by gastrostomy tube once a day  -- Indication: For Need for prophylactic measure    senna 8.8 mg/5 mL oral syrup  -- 5 milliliter(s) by gastrostomy tube once a day (at bedtime)  -- Indication: For bowel regimen     PriLOSEC OTC 20 mg oral delayed release tablet  -- 1 tab(s) by gastrostomy tube once a day  -- Indication: For Need for prophylactic measure I will START or STAY ON the medications listed below when I get home from the hospital:    morphine 20 mg/mL oral concentrate  -- 0.25 milliliter(s) by mouth every 2 hours  prn for SOB MDD:12   -- Caution federal law prohibits the transfer of this drug to any person other  than the person for whom it was prescribed.  Dilute this medication with liquid before administration.  May cause drowsiness.  Alcohol may intensify this effect.  Use care when operating dangerous machinery.  This prescription cannot be refilled.  Using more of this medication than prescribed may cause serious breathing problems.    -- Indication: For Goals of care, counseling/discussion    acetaminophen 160 mg/5 mL oral liquid  -- 20.3 milliliter(s) by gastrostomy tube every 6 hours  as needed for fever   -- This product contains acetaminophen.  Do not use  with any other product containing acetaminophen to prevent possible liver damage.    -- Indication: For Goals of care, counseling/discussion    valproic acid 250 mg/5 mL oral syrup  -- 5 milliliter(s) by gastrostomy tube once a day  -- Indication: For Seizure disorder    mirtazapine 15 mg oral tablet  -- 1 tab(s) by gastrostomy tube once a day (at bedtime)  -- Indication: For Need for prophylactic measure    risperiDONE 0.5 mg oral tablet  -- 1 tab(s) by gastrostomy tube once a day  -- Indication: For Need for prophylactic measure    senna 8.8 mg/5 mL oral syrup  -- 5 milliliter(s) by gastrostomy tube once a day (at bedtime)  -- Indication: For bowel regimen     PriLOSEC OTC 20 mg oral delayed release tablet  -- 1 tab(s) by gastrostomy tube once a day  -- Indication: For Need for prophylactic measure

## 2017-11-29 NOTE — DISCHARGE NOTE ADULT - MEDICATION SUMMARY - MEDICATIONS TO STOP TAKING
I will STOP taking the medications listed below when I get home from the hospital:    docusate sodium 100 mg/25 mL oral syrup  -- 25 milliliter(s) by gastrostomy tube 2 times a day    warfarin 6 mg oral tablet  -- 1 tab(s) by gastrostomy tube once a day

## 2017-11-30 VITALS
DIASTOLIC BLOOD PRESSURE: 91 MMHG | OXYGEN SATURATION: 99 % | RESPIRATION RATE: 20 BRPM | SYSTOLIC BLOOD PRESSURE: 134 MMHG | HEART RATE: 87 BPM

## 2017-11-30 PROCEDURE — 99239 HOSP IP/OBS DSCHRG MGMT >30: CPT

## 2017-11-30 RX ORDER — SENNA PLUS 8.6 MG/1
1 TABLET ORAL
Qty: 30 | Refills: 0 | OUTPATIENT
Start: 2017-11-30 | End: 2017-12-30

## 2017-11-30 RX ORDER — SENNA PLUS 8.6 MG/1
5 TABLET ORAL
Qty: 5 | Refills: 0 | OUTPATIENT
Start: 2017-11-30 | End: 2017-12-30

## 2017-11-30 RX ORDER — RISPERIDONE 4 MG/1
1 TABLET ORAL
Qty: 30 | Refills: 0 | OUTPATIENT
Start: 2017-11-30 | End: 2017-12-30

## 2017-11-30 RX ORDER — ACETAMINOPHEN 500 MG
650 TABLET ORAL
Qty: 120 | Refills: 0 | OUTPATIENT
Start: 2017-11-30 | End: 2017-12-30

## 2017-11-30 RX ORDER — MORPHINE SULFATE 50 MG/1
0.25 CAPSULE, EXTENDED RELEASE ORAL
Qty: 60 | Refills: 0 | OUTPATIENT
Start: 2017-11-30 | End: 2017-12-15

## 2017-11-30 RX ORDER — ACETAMINOPHEN 500 MG
20.3 TABLET ORAL
Qty: 120 | Refills: 0 | OUTPATIENT
Start: 2017-11-30 | End: 2017-12-30

## 2017-11-30 RX ORDER — VALPROIC ACID (AS SODIUM SALT) 250 MG/5ML
5 SOLUTION, ORAL ORAL
Qty: 0 | Refills: 0 | COMMUNITY

## 2017-11-30 RX ORDER — VALPROIC ACID (AS SODIUM SALT) 250 MG/5ML
5 SOLUTION, ORAL ORAL
Qty: 5 | Refills: 0 | OUTPATIENT
Start: 2017-11-30 | End: 2017-12-30

## 2017-11-30 RX ORDER — MIRTAZAPINE 45 MG/1
1 TABLET, ORALLY DISINTEGRATING ORAL
Qty: 0 | Refills: 0 | COMMUNITY

## 2017-11-30 RX ORDER — MORPHINE SULFATE 50 MG/1
5 CAPSULE, EXTENDED RELEASE ORAL
Qty: 60 | Refills: 0 | OUTPATIENT
Start: 2017-11-30 | End: 2017-12-15

## 2017-11-30 RX ORDER — OMEPRAZOLE 10 MG/1
1 CAPSULE, DELAYED RELEASE ORAL
Qty: 30 | Refills: 0 | OUTPATIENT
Start: 2017-11-30 | End: 2017-12-30

## 2017-11-30 RX ORDER — MORPHINE SULFATE 50 MG/1
5 CAPSULE, EXTENDED RELEASE ORAL
Qty: 0 | Refills: 0 | Status: DISCONTINUED | OUTPATIENT
Start: 2017-11-30 | End: 2017-11-30

## 2017-11-30 RX ORDER — MIRTAZAPINE 45 MG/1
1 TABLET, ORALLY DISINTEGRATING ORAL
Qty: 30 | Refills: 0 | OUTPATIENT
Start: 2017-11-30 | End: 2017-12-30

## 2017-11-30 RX ORDER — RISPERIDONE 4 MG/1
1 TABLET ORAL
Qty: 0 | Refills: 0 | COMMUNITY

## 2017-11-30 RX ADMIN — Medication 100 MILLIGRAM(S): at 14:17

## 2017-11-30 RX ADMIN — Medication 3 MILLILITER(S): at 17:21

## 2017-11-30 RX ADMIN — Medication 3 MILLILITER(S): at 08:09

## 2017-11-30 RX ADMIN — Medication 3 MILLILITER(S): at 21:33

## 2017-11-30 RX ADMIN — Medication 3 MILLILITER(S): at 03:45

## 2017-11-30 RX ADMIN — Medication 50 MILLIGRAM(S): at 16:17

## 2017-11-30 RX ADMIN — Medication 100 MILLIGRAM(S): at 05:11

## 2017-11-30 RX ADMIN — Medication 3 MILLILITER(S): at 12:21

## 2017-11-30 RX ADMIN — Medication 50 MILLIGRAM(S): at 06:45

## 2017-11-30 NOTE — PROGRESS NOTE ADULT - PROBLEM SELECTOR PROBLEM 5
Palliative care encounter
Seizure disorder
Atrial fibrillation, unspecified type
Seizure disorder
Atrial fibrillation, unspecified type
Palliative care encounter
Palliative care encounter
Seizure disorder
Seizure disorder
Atrial fibrillation, unspecified type
Atrial fibrillation, unspecified type

## 2017-11-30 NOTE — PROGRESS NOTE ADULT - SUBJECTIVE AND OBJECTIVE BOX
CC: F/U for respiratory failure.    SUBJECTIVE / OVERNIGHT EVENTS:  No acute events overnight.    MEDICATIONS  (STANDING):  ALBUTerol/ipratropium for Nebulization 3 milliLiter(s) Nebulizer every 4 hours  aztreonam  IVPB 1000 milliGRAM(s) IV Intermittent every 8 hours  aztreonam  IVPB      metroNIDAZOLE  IVPB 500 milliGRAM(s) IV Intermittent every 8 hours  metroNIDAZOLE  IVPB      valproic  acid Syrup 250 milliGRAM(s) Oral every 12 hours    MEDICATIONS  (PRN):  morphine   Solution 5 milliGRAM(s) Oral every 3 hours PRN dyspnea      Vital Signs Last 24 Hrs  T(C): 37.7 (30 Nov 2017 15:27), Max: 37.7 (30 Nov 2017 15:27)  T(F): 99.9 (30 Nov 2017 15:27), Max: 99.9 (30 Nov 2017 15:27)  HR: 102 (30 Nov 2017 15:27) (80 - 108)  BP: 128/83 (30 Nov 2017 15:27) (123/83 - 133/79)  BP(mean): --  RR: 20 (30 Nov 2017 15:27) (20 - 20)  SpO2: 93% (30 Nov 2017 15:27) (93% - 97%)  CAPILLARY BLOOD GLUCOSE        I&O's Summary      PHYSICAL EXAM:  GENERAL: NAD, not responsive to verbal stimuli.  HEAD:  Atraumatic, Normocephalic, BiPAP mask in place.  EYES: EOMI, PERRLA, conjunctiva and sclera clear  NECK: Supple, No JVD  CHEST/LUNG: Coarse breath sounds due to BiPAP.    HEART: Regular rate and rhythm; No murmurs, rubs, or gallops  ABDOMEN: Soft, Nontender, Nondistended; Bowel sounds present  EXTREMITIES:  2+ Peripheral Pulses, No clubbing, cyanosis, or edema  PSYCH: Calm  NEUROLOGY: Responds to noxious stimuli.  SKIN: No rashes or lesions    LABS:                    RADIOLOGY & ADDITIONAL TESTS:    Imaging Personally Reviewed:    Care Discussed with Consultants/Other Providers:

## 2017-11-30 NOTE — PROGRESS NOTE ADULT - PROBLEM SELECTOR PLAN 7
discussed with daughter and wife by bedside for 20 minutes.  Poor prognosis.  Agreeable for hospice - ideally home, but wants to make sure that patient's comfort is priority.  DNR/DNI.
Poor prognosis.  Agreeable for hospice - ideally home, but wants to make sure that patient's comfort is priority.  DNR/DNI.
-  appreciate nursing care
DNR/DNI.  Palliative care consult for goals of care discussion.
-  appreciate nursing care
discussed with daughter and wife by bedside for 20 minutes.  Poor prognosis.  Agreeable for hospice - ideally home, but wants to make sure that patient's comfort is priority.  DNR/DNI.
-  appreciate nursing care
-  appreciate nursing care

## 2017-11-30 NOTE — PROGRESS NOTE ADULT - PROBLEM SELECTOR PROBLEM 7
Advanced care planning/counseling discussion
Advanced care planning/counseling discussion
Functional quadriplegia
DNR (do not resuscitate)
Advanced care planning/counseling discussion
DNR (do not resuscitate)
Functional quadriplegia

## 2017-11-30 NOTE — PROGRESS NOTE ADULT - PROVIDER SPECIALTY LIST ADULT
Hospitalist
Internal Medicine
Internal Medicine
Palliative Care
Gastroenterology
Hospitalist
Hospitalist

## 2017-11-30 NOTE — PROGRESS NOTE ADULT - PROBLEM SELECTOR PROBLEM 3
Hypotension, unspecified hypotension type
Atrial fibrillation, unspecified type
Atrial fibrillation, unspecified type
Cerebrovascular accident (CVA), unspecified mechanism
Cerebrovascular accident (CVA), unspecified mechanism
Hypotension, unspecified hypotension type
Atrial fibrillation, unspecified type
Cerebrovascular accident (CVA), unspecified mechanism
Hypotension, unspecified hypotension type
Hypotension, unspecified hypotension type
Atrial fibrillation, unspecified type

## 2017-11-30 NOTE — PROGRESS NOTE ADULT - PROBLEM SELECTOR PROBLEM 1
Aspiration pneumonia, unspecified aspiration pneumonia type, unspecified laterality, unspecified part of lung
Acute respiratory failure with hypoxia
Acute respiratory failure with hypoxia
Aspiration pneumonia, unspecified aspiration pneumonia type, unspecified laterality, unspecified part of lung
Aspiration pneumonia, unspecified aspiration pneumonia type, unspecified laterality, unspecified part of lung
Dyspnea
Acute respiratory failure with hypoxia
Aspiration pneumonia, unspecified aspiration pneumonia type, unspecified laterality, unspecified part of lung
Acute respiratory failure with hypoxia

## 2017-11-30 NOTE — PROGRESS NOTE ADULT - PROBLEM SELECTOR PLAN 5
Family very clear about their goals and wishes - wife wants pt to go home with hospice.  They understand that they will be administering morphine at home via PEG.  They also understand that when BIPAP is removed, pt may  within hours to days.  [facility]  and  are helping to coordinate patient's home care.

## 2017-11-30 NOTE — PROGRESS NOTE ADULT - PROBLEM SELECTOR PLAN 2
- patient met sepsis criteria on admission (fever/tachycardia), presumed respiratory source of infection given respiratory distress  - sepsis resolving, afebrile, without leukocytosis, not tachycardic  - has been hypotensive to 70s in ED, evaluated by MICU, was in 80s at one point this admission, pressures continues to improve, currently 130-140s  - RVP positive for parainfluenza 1  - clinical concern for aspiration given known dysphagia  - c/w supportive care for parainfluenza 1, on clindamycin to cover anaerobes (pt with reported PCN allergy), plan for 5 day course abx, will transition to PO abx (via PEG) today as patient clinical status improving (last day abx 11/24)  - Bcx/Ucx NG thus far
Supportive care with BiPAP for now.
- patient met sepsis criteria on admission (fever/tachycardia), presumed respiratory source of infection given respiratory distress  - sepsis resolving, afebrile, without leukocytosis   - has been hypotensive to 70s in ED, evaluated by MICU, was in 80s at one point yesterday, pressures little bit better today, SBP 90-110s  - RVP positive for parainfluenza 1  - clinical concern for aspiration given known dysphagia  - c/w supportive care for parainfluenza 1, on clindamycin to cover anaerobes (pt with reported PCN allergy), plan for 5 day course abx, hopefully transition to PO abx (via PEG) as clinical condition improves  - Bcx/Ucx NG thus far
Symptom control with morphine.
complicated by aspiration PNA.  Pt will likely be discharged to home with hospice tomorrow on BiPAP.
complicated by aspiration PNA.  Supportive care for influenza, abx for PNA.  During family meeting today, we discussed the severity of pt's infection and given hx of CVA, poor physiologic reserve, pt may not be able to overcome this infection.
Symptom control with morphine.
complicated by aspiration PNA.  Pt will likely be discharged to home with hospice today on BiPAP.
- patient met sepsis criteria on admission (fever/tachycardia), presumed respiratory source of infection given respiratory distress  - sepsis resolving, afebrile, not tachycardic but with leukopenia, occasional tachypnea  - has been hypotensive to 70s in ED, evaluated by MICU, was in 80s at one point this admission, pressures continues to improve, currently 100-130s  - RVP positive for parainfluenza 1  - clinical concern for aspiration given known dysphagia  - c/w supportive care for parainfluenza 1, on clindamycin and aztreonam (added yesterday) to cover aspiration PNA, hopeful to complete course soon...  - Bcx/Ucx NG thus far
Symptom control with morphine.
- patient met sepsis criteria on admission (fever/tachycardia), presumed respiratory source of infection given respiratory distress  - has been hypotensive to 70s in ED, evaluated by MICU, now SBP in the 80s  - RVP positive for parainfluenza 1  - clinical concern for aspiration given known dysphagia  - c/w supportive care for parainfluenza 1, on clindamycin to cover anaerobes (pt with reported PCN allergy)  - Bcx/Ucx pending, f/u results

## 2017-11-30 NOTE — PROGRESS NOTE ADULT - ATTENDING COMMENTS
Interval events noted. Reported to be tachypneic at times. Admitted with parainfluenza respiratory infection and antibiotics were added for possible component of bacterial infection. PEG replacement discussed with wife and daughter. Of note, current GT is functional without reported issues of obstruction or significant breakdown resulting in leak. Can continue gastrostomy feedings via existing tube. No urgent need for gastrostomy replacement. Wife had initially preferred  a non-balloon-type replacement tube that would require upper endoscopy for placement. However, as noted, concern exists over upper endoscopy and sedation in view of current respiratory status. Therefore, defer EGD/PEG replacement for today. Can be reassessed later in hospital course pending stable respiratory status. In addition, the option of a balloon-type replacement GT was again discussed which would not require endoscopy and could be done at the bedside. The family will consider these options.
From home with 24/7 HHA, patient appears to be clinically improving, will need resumption of aide services prior to eventual discharge.
Patient medically optimized for discharge to home hospice.  Discharge planning 40 minutes - discussed with patient and consultants.
Pt seen with fellow.  Agree with above.  Discharge planning to hospice at home.
Pt seen with fellow.  Agree with above.  Goals as stated.  Transition to liquid morphine via peg on home hospice
Pt seen with fellow.  Agree with above.  Goal is for comfort and transition home on hospice.  Referral made.
From home with 24/7 HHA, patient appears to be clinically improving, will need resumption of aide services prior to eventual discharge.

## 2017-11-30 NOTE — PROGRESS NOTE ADULT - PROBLEM SELECTOR PLAN 1
Pt will remain on BiPAP and be discharged home on BiPAP this afternoon after F2F with [facility] medical director.  - Continue Morphine solution 5mg q3 PRN dyspnea  - Morphine solution order sent to [facility] pharmacy per primary team

## 2017-11-30 NOTE — PROGRESS NOTE ADULT - SUBJECTIVE AND OBJECTIVE BOX
INTERVAL HPI/OVERNIGHT EVENTS:  Patient appears to be in no acute distress.  Per [facility] liaison, patient was on hospice previously and needs F2F with MD prior to discharge.  MD arriving this afternoon, and patient will be discharged home.  Family updated at bedside.      Allergies    penicillins (Unknown)    Intolerances    ADVANCE DIRECTIVES:    DNR: [x] YES [ ] NO           PRESENT SYMPTOMS:   SOURCE:  [ ] Patient   [x] Family   [x] Team     Pain: None    Dyspnea:  [x] YES [ ] NO  Anxiety:  [ ] YES [x] NO  Fatigue: [x YES [ ] NO  Nausea: [ ] YES [x] NO  Loss of Appetite: [x] YES [ ] NO  Constipation [ ] YES   [x] No     OTHER SYMPTOMS:  [ ] All other ROS negative     [x] Unable to obtain due to poor mentation    MEDICATIONS  (STANDING):  ALBUTerol/ipratropium for Nebulization 3 milliLiter(s) Nebulizer every 4 hours  aztreonam  IVPB 1000 milliGRAM(s) IV Intermittent every 8 hours  aztreonam  IVPB      metroNIDAZOLE  IVPB 500 milliGRAM(s) IV Intermittent every 8 hours  metroNIDAZOLE  IVPB      valproic  acid Syrup 250 milliGRAM(s) Oral every 12 hours    MEDICATIONS  (PRN):  morphine   Solution 5 milliGRAM(s) Oral every 3 hours PRN dyspnea      Karnofsky Performance Score/Palliative Performance Status Version 2:         10%  Protein Calorie Malnutrition:  [x] Mild   [ ] Moderate   [ ] Severe     Physical Exam:    General: [ ] Alert,  A&O x     [x] lethargic   [ ] Agitated   [ ] Cachexia   HEENT: [ ] Normal   [x] Dry mouth   [ ] ET Tube    [ ] Trach   Lungs: [ ] Clear [x] Rhonchi  [ ] Crackles [ ] Wheezing [x] Tachypnea  [ ] Audible excessive secretions   Cardiovascular:  [x] Regular rate and rhythm  [ ] Irregular [ ] Tachycardia   [ ] Bradycardia   Abdomen:  [x] Soft  [ ] Distended  [ ]  [ ] +BS  [ ] Non tender [ ] Tender  [ ]PEG   [ ] NGT   Last BM:     Genitourinary:  [ ] Normal [x] Incontinent   [ ] Oliguria/Anuria   [ ] Kirkpatrick  Musculoskeletal:  [ ] Normal   [x] Generalized weakness  [x] Bedbound   Neurological: [ ] No focal deficits  [x] Cognitive impairment     Skin: [x] Normal   [ ] Pressure ulcers     Vital Signs Last 24 Hrs  T(C): 37.2 (30 Nov 2017 05:09), Max: 37.7 (29 Nov 2017 14:29)  T(F): 99 (30 Nov 2017 05:09), Max: 99.9 (29 Nov 2017 14:29)  HR: 97 (30 Nov 2017 11:14) (80 - 101)  BP: 123/83 (30 Nov 2017 05:09) (118/76 - 133/79)  BP(mean): --  RR: 20 (30 Nov 2017 05:09) (18 - 20)  SpO2: 96% (30 Nov 2017 11:14) (93% - 100%)             11.1   8.41  )-----------( 182      ( 28 Nov 2017 05:14 )             34.8     11-28    142  |  100  |  14  ----------------------------<  92  4.5   |  31  |  0.39<L>    Ca    8.3<L>      28 Nov 2017 05:14      PT/INR - ( 27 Nov 2017 07:09 )   PT: 14.1 SEC;   INR: 1.25       I&O's Summary    RADIOLOGY & ADDITIONAL STUDIES: none new.

## 2017-11-30 NOTE — CHART NOTE - NSCHARTNOTEFT_GEN_A_CORE
received a Call from Adrianna Christianson from Hospice regarding Pt's discharge-   Pt was scheduled for d/c home with hospice with BIPAP machine.   However when pt was transferred home the Ambulance company realized pt only has a Oxygen tank and does not have a BIPAP.  Adrianna christianson from hospice is trying to get in touch with [facility] regarding BIPAP delivery.   However per Adrianna Christianson, pt is coming back to [address] [place] [address]. Informed Dr Kowalski on likely readmission.

## 2017-11-30 NOTE — PROGRESS NOTE ADULT - PROBLEM SELECTOR PROBLEM 6
DNR (do not resuscitate)
Dysphagia, unspecified type
Cerebrovascular accident (CVA), unspecified mechanism
Dysphagia, unspecified type
Cerebrovascular accident (CVA), unspecified mechanism
DNR (do not resuscitate)
Dysphagia, unspecified type
Goals of care, counseling/discussion
Dysphagia, unspecified type
Cerebrovascular accident (CVA), unspecified mechanism
Cerebrovascular accident (CVA), unspecified mechanism

## 2017-11-30 NOTE — PROGRESS NOTE ADULT - PROBLEM SELECTOR PLAN 4
Discontinue ASA, statins as it provides no immediate clinical benefit.
- at baseline, patient is bedbound/non-verbal but per daughter is able to move eyes and recognize family  - d/w family today, they feel that patient's mental status is improving, with much better eye contact   - metabolic encephalopathy 2/2 infection/sepsis, treating underlying infection
ASA and lipitor on hold due to poor prognosis associated with aspiration.
- at baseline, patient is bedbound/non-verbal but per daughter is able to move eyes and recognize family  - f/u with family to assess if he is closer to baseline in their eyes  - metabolic encephalopathy 2/2 infection/sepsis, treating underlying infection
Discontinue ASA, statins as it provides no immediate clinical benefit.
KPS 10%
KPS 10%
Discontinue ASA, statins as it provides no immediate clinical benefit.
KPS 10%
- at baseline, patient is bedbound/non-verbal but per daughter is able to move eyes and recognize family  - improving  - metabolic encephalopathy 2/2 infection/sepsis, treating underlying infection
- at baseline, patient is bedbound/non-verbal but per daughter is able to move eyes and recognize family  - currently not able to do so  - metabolic encephalopathy 2/2 infection/sepsis, treating underlying infection

## 2017-11-30 NOTE — PROGRESS NOTE ADULT - PROBLEM SELECTOR PROBLEM 4
Cerebrovascular accident (CVA), unspecified mechanism
Metabolic encephalopathy
Cerebrovascular accident (CVA), unspecified mechanism
Cerebrovascular accident (CVA), unspecified mechanism
Debility
Debility
Metabolic encephalopathy
Cerebrovascular accident (CVA), unspecified mechanism
Debility
Metabolic encephalopathy
Metabolic encephalopathy

## 2017-11-30 NOTE — PROGRESS NOTE ADULT - PROBLEM SELECTOR PROBLEM 2
Parainfluenza type 1 infection
Acute respiratory failure with hypoxia
Parainfluenza type 1 infection
Acute respiratory failure with hypoxia

## 2018-05-26 NOTE — PROVIDER CONTACT NOTE (OTHER) - SITUATION
Patient is NPO and his tube feeding has been held.  Pantoprazole should be given with the tube feeding.  I requested that it be held
Patient's BP low at 84/56.
Patient's O2 saturation is 92 and began decreasing to 74 and heart rate increased 149
Patient's oxygen saturation decreased to 80% and he was visibly using his accessory muscles to breath.  He was tachypneic as well.
Making provider aware of family concerns that PEG tube black.
Patient has been NPO since midnight and pantoprazole should be held
Patient will remain NPO while on continuous bipap.  Patient was ordered to receive Jevity 1.2 bolus q8.
arrived ambulatory for medication refill denies any discomfort or pain

## 2018-09-18 NOTE — DISCHARGE NOTE ADULT - NSFTFATTENDCERTRD_GEN_ALL_CORE
Patient complains of generalized body pain.
I, the Attending Physician, certify the above stated patient is homebound and upon completion of the Face-To-Face encounter, has the need for intermittent skilled nursing, physical therapy and/or speech or occupational therapy services in their home for their current diagnosis as outlined in their initiial plan of care. These services will continue to be monitored by myself or another physician

## 2019-02-05 NOTE — PROGRESS NOTE ADULT - PROBLEM SELECTOR PLAN 8
Patient is aware - s/p PEG  - family requesting potential replacement this admission if possible (when stabilized) given difficulty with taking patient to outpatient providers  - PEG tube currently functioning  - appreciate GI eval, they had d/w family different options, to consider procedure as patient becomes more stable (in particular the respiratory status), to re-eval status Monday  (to hold feeds Portillo night)  - c/w tube feeds (bolus regimen as per family), appreciate nutrition recs, consider increasing to 2 cans per bolus feed (480 ml tid)

## 2020-12-12 NOTE — ED PROVIDER NOTE - NS_EDPROVIDERDISPOUSERTYPE_ED_A_ED
Follow-up Pulm Progress Note  Luis Castro MD  [phone]    AFebrile  TTE: EF 55%; severe AS and mild MS  Sleeping comfortably on RA    Vital Signs Last 24 Hrs  T(C): 36.7 (12 Dec 2020 04:56), Max: 36.7 (12 Dec 2020 04:56)  T(F): 98 (12 Dec 2020 04:56), Max: 98 (12 Dec 2020 04:56)  HR: 76 (12 Dec 2020 10:39) (76 - 91)  BP: 128/70 (12 Dec 2020 10:39) (128/70 - 161/74)  BP(mean): --  RR: 18 (12 Dec 2020 04:56) (18 - 18)  SpO2: 93% (12 Dec 2020 04:56) (93% - 94%)                          11.1   7.35  )-----------( 197      ( 11 Dec 2020 04:56 )             34.1     12-12    142  |  108  |  46<H>  ----------------------------<  112<H>  4.4   |  23  |  1.70<H>    Ca    8.8      12 Dec 2020 07:25  Phos  4.2     12-12  Mg     1.9     12-12      Serum Pro-Brain Natriuretic Peptide: 78506 pg/mL (12-09-20 @ 15:07)    Physical Examination:  PULM: No crackles, wheeze,or rhonchi  CVS: Regular rate and rhythm, no murmurs, rubs, or gallops  ABD: Soft, non-tender  EXT:  No clubbing, cyanosis, or edema    RADIOLOGY REVIEWED  CXR:    CT chest:    TTE:   Attending Attestation (For Attendings USE Only)...

## 2021-05-26 NOTE — ED PROVIDER NOTE - INPATIENT RESIDENT/ACP NOTIFIED DATE
Patient : Moises Kaiser Age: 62 year old Sex: male   MRN: [id number] Encounter Date: 5/26/2021      History     Chief Complaint   Patient presents with   • Groin Pain     and burining sensation to right groin. Hx Hernia repair in March 2021     62 M with PMHx of right inguinal hernia repair in late March 2021 (by Dr. Renae), HTN, GERD, and HLD presenting for abdominal and groin pain.  Patient states that this has been ongoing since his hernia repair.  Describes the pain as being mid abdominal and radiating to his groin.  Patient describes pain as a \"bubbling/ burning\" sensation. Occurring intermittently, mostly at night.  Patient denies any fevers, chills, nausea, vomiting, diarrhea, dysuria, hematuria, testicular or scrotal pain or swelling, or urethral discharge.  Patient also reports that he has been unable to follow-up with his surgeon.  Also reports he was seen for same symptoms earlier this month and completed course of antibiotics.   Also reports that he has seen his PCP for this matter as well.     No other complaints- denies any f cough, shortness of breath, chest pain.    Per chart review, patient was seen at outside ED on 5/17 for similar complaints and CT showed gastritis duodenitis with possible proctitis.  He was discharged on famotidine and Augmentin with GI follow-up.    The history is provided by the patient and medical records.       No Known Allergies    Discharge Medication List as of 5/26/2021  4:12 PM      Prior to Admission Medications    Details   sucralfate (CARAFATE) 1 g tablet Take 1 tablet by mouth 4 times daily.Eprescribe, Disp-120 tablet, R-3      famotidine (PEPCID) 20 MG tablet Take 1 tablet by mouth 2 times daily.Normal, Disp-14 tablet, R-0      brimonidine (ALPHAGAN) 0.2 % ophthalmic solution APPLY 1 DROP IN BOTH EYES TWICE DAILYHistorical Med      latanoprost (XALATAN) 0.005 % ophthalmic solution Historical Med      atorvastatin (LIPITOR) 40 MG tablet Take 1 tablet by mouth  daily.Eprescribe, Disp-90 tablet,R-3      sildenafil (VIAGRA) 100 MG tablet Take 1 tablet by mouth as needed for Erectile Dysfunction.Eprescribe, Disp-10 tablet, R-6             Past Medical History:   Diagnosis Date   • Anxiety    • Cataracts, bilateral    • Essential hypertension 2/10/2021   • GERD (gastroesophageal reflux disease)    • Hyperlipidemia    • Prostate CA (CMS/HCC)    • Sarcoidosis        Past Surgical History:   Procedure Laterality Date   • CATARACT EXTRACTION, BILATERAL     • HERNIA REPAIR Right 03/09/2021    Incarcerated inguinal   • PROSTATE SURGERY         Family History   Problem Relation Age of Onset   • Hypertension Mother    • Hypertension Father    • Patient is unaware of any medical problems Sister        Social History     Tobacco Use   • Smoking status: Never Smoker   • Smokeless tobacco: Never Used   Substance Use Topics   • Alcohol use: No   • Drug use: No       See HPI, otherwise ROS negative  Review of Systems   Constitutional: Negative.    HENT: Negative.    Eyes: Negative.    Respiratory: Negative.    Cardiovascular: Negative.    Gastrointestinal: Negative.    Endocrine: Negative.    Genitourinary: Negative.    Musculoskeletal: Negative.    Skin: Negative.    Allergic/Immunologic: Negative.    Neurological: Negative.    Hematological: Negative.    Psychiatric/Behavioral: Negative.    All other systems reviewed and are negative.      Physical Exam     ED Triage Vitals [05/26/21 0931]   ED Triage Vitals Group      Temp 98.1 °F (36.7 °C)      Heart Rate 99      Resp 16      BP (!) 154/108      SpO2 98 %      EtCO2 mmHg       Height       Weight       Weight Scale Used       BMI (Calculated)       IBW/kg (Calculated)        Physical Exam  Vitals and nursing note reviewed. Exam conducted with a chaperone present.   Constitutional:       General: He is not in acute distress.     Appearance: Normal appearance. He is not ill-appearing, toxic-appearing or diaphoretic.   HENT:      Head:  Normocephalic and atraumatic.      Right Ear: External ear normal.      Left Ear: External ear normal.      Mouth/Throat:      Mouth: Mucous membranes are moist.   Eyes:      Extraocular Movements: Extraocular movements intact.      Conjunctiva/sclera: Conjunctivae normal.      Pupils: Pupils are equal, round, and reactive to light.   Cardiovascular:      Rate and Rhythm: Normal rate and regular rhythm.      Heart sounds: No murmur.   Pulmonary:      Effort: No respiratory distress.      Breath sounds: Normal breath sounds. No stridor. No wheezing or rales.   Abdominal:      General: Abdomen is flat. There is no distension.      Tenderness: There is no abdominal tenderness. There is no right CVA tenderness, left CVA tenderness, guarding or rebound.      Hernia: There is no hernia in the left inguinal area or right inguinal area.   Genitourinary:     Penis: Normal.       Testes:         Right: Tenderness or swelling not present.         Left: Tenderness or swelling not present.   Musculoskeletal:         General: No deformity.      Cervical back: Neck supple. No muscular tenderness.      Right lower leg: No edema.      Left lower leg: No edema.   Skin:     General: Skin is warm and dry.      Capillary Refill: Capillary refill takes less than 2 seconds.   Neurological:      General: No focal deficit present.      Mental Status: He is alert and oriented to person, place, and time.   Psychiatric:         Mood and Affect: Mood normal.         Behavior: Behavior normal.         Thought Content: Thought content normal.         Judgment: Judgment normal.         ED Course     Procedures    Lab Results     Results for orders placed or performed during the hospital encounter of 05/26/21   Basic Metabolic Panel   Result Value Ref Range    Fasting Status      Sodium 141 135 - 145 mmol/L    Potassium 4.1 3.4 - 5.1 mmol/L    Chloride 105 98 - 107 mmol/L    Carbon Dioxide 28 21 - 32 mmol/L    Anion Gap 12 10 - 20 mmol/L    Glucose  142 (H) 65 - 99 mg/dL    BUN 11 6 - 20 mg/dL    Creatinine 0.99 0.67 - 1.17 mg/dL    Glomerular Filtration Rate >90 >90 mL/min/1.73m2    BUN/ Creatinine Ratio 11 7 - 25    Calcium 8.5 8.4 - 10.2 mg/dL   CBC with Automated Differential (performable only)   Result Value Ref Range    WBC 3.3 (L) 4.2 - 11.0 K/mcL    RBC 4.90 4.50 - 5.90 mil/mcL    HGB 13.8 13.0 - 17.0 g/dL    HCT 41.0 39.0 - 51.0 %    MCV 83.7 78.0 - 100.0 fl    MCH 28.2 26.0 - 34.0 pg    MCHC 33.7 32.0 - 36.5 g/dL    RDW-CV 12.3 11.0 - 15.0 %    RDW-SD 37.5 (L) 39.0 - 50.0 fL     140 - 450 K/mcL    NRBC 0 <=0 /100 WBC    Neutrophil, Percent 47 %    Lymphocytes, Percent 38 %    Mono, Percent 11 %    Eosinophils, Percent 3 %    Basophils, Percent 1 %    Immature Granulocytes 0 %    Absolute Neutrophils 1.5 (L) 1.8 - 7.7 K/mcL    Absolute Lymphocytes 1.2 1.0 - 4.0 K/mcL    Absolute Monocytes 0.4 0.3 - 0.9 K/mcL    Absolute Eosinophils  0.1 0.0 - 0.5 K/mcL    Absolute Basophils 0.0 0.0 - 0.3 K/mcL    Absolute Immmature Granulocytes 0.0 0.0 - 0.2 K/mcL   Urinalysis & Reflex Microscopy With Culture If Indicated   Result Value Ref Range    COLOR, URINALYSIS Yellow     APPEARANCE, URINALYSIS Clear     GLUCOSE, URINALYSIS Negative Negative mg/dL    BILIRUBIN, URINALYSIS Negative Negative    KETONES, URINALYSIS Negative Negative mg/dL    SPECIFIC GRAVITY, URINALYSIS 1.010 1.005 - 1.030    OCCULT BLOOD, URINALYSIS Negative Negative    PH, URINALYSIS 6.0 5.0 - 7.0    PROTEIN, URINALYSIS Negative Negative mg/dL    UROBILINOGEN, URINALYSIS 0.2 0.2, 1.0 mg/dL    NITRITE, URINALYSIS Negative Negative    LEUKOCYTE ESTERASE, URINALYSIS Negative Negative   Gray Urine Preservative   Result Value Ref Range    Extra Tube Hold for Add Ons    URINALYSIS, MACROSCOPIC -POINT OF CARE   Result Value Ref Range    COLOR - POINT OF CARE Yellow     APPEARANCE, URINALYSIS - POINT OF CARE Clear     GLUCOSE, URINALYSIS - POINT OF CARE Negative Negative mg/dL    BILIRUBIN,  URINALYSIS - POINT OF CARE Negative Negative    KETONES, URINALYSIS - POINT OF CARE Negative Negative mg/dL    SPECIFIC GRAVITY, URINALYSIS - POINT OF CARE 1.015 1.005 - 1.030 NULL    OCCULT BLOOD, URINALYSIS - POINT OF CARE Trace (A) Negative    PH, URINALYSIS - POINT OF CARE 6.0 5.0 - 7.0 Units    PROTEIN, URINALYSIS - POINT OF CARE Negative Negative mg/dL    UROBILINOGEN, URINALYSIS - POINT OF CARE 0.2 0.2, 1.0 mg/dL    NITRITE, URINALYSIS - POINT OF CARE Negative Negative    WBC ESTERASE, URINALYSIS - POINT OF CARE Negative Negative       Radiology Results     Imaging Results    None         ED Medication Orders (From admission, onward)    Ordered Start     Status Ordering Provider    05/26/21 1546 05/26/21 1546  alum-mag hydroxide+simethicone/lidocaine viscous (2:1) (MAGIC MOUTHWASH/GI COCKTAIL) (compounded) oral suspension 15 mL  ONCE      Last MAR action: Given BEULAH GÓMEZ    05/26/21 1546 05/26/21 1547  acetaminophen (TYLENOL) tablet 1,000 mg  ONCE      Last MAR action: Given BEULAH GÓMEZ          ED Course as of Enoc 15 0815   Wed May 26, 2021   1123 URINALYSIS, MACROSCOPIC -POINT OF CARE(!):    Urine Color Yellow   Urine Character Clear   Urine Glucose Negative   Urine Bilirubin Negative   URINE KETONES Negative   Urine Specific Gravity 1.015   Urine Blood Trace(!)   Urine PH 6.0   Urine Protein Negative   POCT Urobilinogen 0.2   Urine Nitrite Negative   WBC (Leukocyte) Esterase POC Negative [KG]   1124 Urine Blood(!): Trace [KG]   1124 WBC(!): 3.3 [KG]   1124 HGB: 13.8 [KG]   1124 Creatinine: 0.99 [KG]      ED Course User Index  [KG] Beulah Gómez MD       MDM  Number of Diagnoses or Management Options  History of gastritis  Right groin pain  Diagnosis management comments:     62-year-old male coming in for persistent abdominal pain.  VSS  Exam as above  Labs reviewed and acceptable  UA micro with no blood or signs on infection    Patient also had his significant other at bedside.  Day relayed frustration  as they have stated that patient has been experiencing abdominal pain and no source identified.  Patient has seen his PCP, cardiologist, GI doctor, and has had previous ED visit.  He does report that he has been unable to follow-up with his general surgeon status post his surgery.  He does have a follow-up with the GI scheduled.  I discussed reassuring labs with the patient and discussed although we did not find the patient's cause of symptoms, there was no acute urgent or emergent etiology that we were noting in the ER.  Also discussed that I did not believe patient would benefit from a repeat CT at this point given duration of his pain.     CM did assist in scheduling f/u appt with Dr. Renae.     Patient discharged in stable condition.  Given strict return precautions.        Amount and/or Complexity of Data Reviewed  Clinical lab tests: reviewed  Review and summarize past medical records: yes  Independent visualization of images, tracings, or specimens: yes        Clinical Impression     ED Diagnosis   1. Right groin pain     2. History of gastritis         Disposition        Discharge 5/26/2021  3:47 PM  Moises Kaiser discharge to home/self care.                         Jona Gómez MD  06/15/21 [id number]     20-Jan-2017 10:44

## 2021-09-02 NOTE — ED PROVIDER NOTE - PRINCIPAL DIAGNOSIS
Initial Treatment Date: 07-  Occupation: Works [employer]  Referred by: Lane Thakur DO  Primary Care Physician: Lane Thakur DO  Date informed consent signed:  07-  Visit Number of Current Episode: 11  Length of Visit: 15 min.  Room:  F/D    Subjective:   Frederic is a 35 year old male, antonio, who presents today for continued treatment of neck, mid and lower back pain. The neck and upper back pain is less frequent, mild to moderate, tight, sore and achy.  The mid and lower back pain is constant, moderate, sharp and stabbing and achy with frequent reduced numbness tingling sensations radiating into the right anterior thigh.  Continues reporting favorable improvement after treatments but seems to have a lot of unfortunate incidents that continue to exacerbated his complaints.  Last time he left here he lost his balance falling on his left side while he was on the bus as it was taking off.    Objective findings   Moderate palpatory tenderness is noted over the cervical, thoracic, lumbar and bilateral sacroiliac joints at the PSIS and PI IS levels. Moderate grade joint restrictions are noted at the following levels: C0-C2, C4-T8, T10-L1, L4-S1 and at the bilateral sacroiliac joints. Muscle hypertonicity is graded at 3 or moderate and is contributing to restricted mobility in the involved areas. These areas include the hamstrings, anterior pelvic and gluteal musculature as well as the paraspinals.    07/14/2021 x-ray lumbar spine:  Minimal degenerative disc disease.    05/13/2021 MRI lumbar spine:  Degenerative changes: Intervertebral disc height and T2 signal loss is mild at L5-S1 and minimal at L4-L5.  Small posterior annular fissure suggested at L5-S1 and minimally at L4-L5.  Mild lower lumbar facet arthropathy.     Segmental analysis:   T12-L1:   No significant spinal canal or foraminal stenosis.       L1-L2:   No significant spinal canal or foraminal stenosis.       L2-L3:   No significant spinal  canal or foraminal stenosis.       L3-L4:  Minimal symmetric disc bulge and mildly prominent dorsal epidural fat  without significant spinal canal or foraminal stenosis.     L4-L5:   Mild symmetric disc bulge with small superimposed shallow central disc  protrusion, thick ligamenta flava, facet arthropathy and minimally  prominent dorsal epidural fat without significant spinal canal stenosis.  Minimal bilateral foraminal narrowing due to disc bulge, dorsal osteophytic  ridging and facet arthropathy.     L5-S1:   Minimal symmetric disc bulge with small superimposed shallow central/left  central disc protrusion without significant spinal canal stenosis.  Mild bilateral foraminal narrowing due to disc bulge, dorsal osteophytic  ridging and facet arthropathy.     IMPRESSION: Mildly motion degraded.     1.  Mild spondylosis including small disc protrusions and posterior annular  fissures at L4-S1 as above.  No significant spinal canal stenosis.     2.  Foraminal narrowing is mild at bilateral L5-S1 and minimal bilateral  L4-L5.      Assessment:  Segmental and somatic dysfunction lumbar, cervical, thoracic and sacral areas with lumbar facet pain and lumbar discogenic pain.  Patient responding favorably overall but continues to exacerbate his condition with areas daily activity.  Annular tears and disc derangement can certainly cause flare ups.  If conservative orthopedic treatment is unable to resolve condition or adequately manage it pain management recommendations may be needed. I suggested that he could utilize urgent care but he still elected to use the emergency room.  His BMI and the condition of combined with disc and joint disease is all contributing to these fluctuations of pain. I see he has an appointment with [facility] coming up in about 2 or 3 weeks which I highly recommend he keep.    Complicating Factors/Co-morbidities:   Annular tears in the lumbar spine and mild disc  protrusions and degenerative changes throughout, BMI over 36 and lack of regular stretching or exercise.    Working diagnoses:     1. Segmental and somatic dysfunction of lumbar region    2. Segmental and somatic dysfunction of cervical region    3. Segmental and somatic dysfunction of thoracic region    4. Segmental and somatic dysfunction of sacral region    5. Lumbar facet joint pain    6. Lumbar discogenic pain syndrome      Treatment today:   All joint restrictions in the cervical, thoracic, lumbar and bilateral sacroiliac joints were treated today and the exact levels are listed in the objective section. These areas were adjusted today utilizing a gentle diversified technique to correct aberrant joint function and reduce scar tissue formation. Ramirez flexion distraction technique was utilized to decompress lumbar discs and to improve range of motion for 4 minutes.  This procedure was done without incident at the site(s) of restriction.    Unattended interferential current was applied to the cervical, thoracic and lumbar sites as noted in the objective section for 15 minutes at an intensity level that was comfortable for the patient. This modality was performed to reduce pain and gently decongest tissues to help the patient's condition heal. Patient tolerated the procedure well.    *Patient has an up-to-date, signed, commercial waiver form that is on the file as of visit date July 26, 2021 and is valid through July 26, 2022. Patient understands the electric stimulation, ultrasound therapy and therapeutic exercise treatment are a non-covered service(s) through Medicaid and agrees to be financially responsible for this service.*    Plan:  Continue treatment plan as outlined July 26, 2021.     Patient Instruction/Education:   Patient advised to utilize ice or cold compresses over the involved regions at home to help reduce pain and inflammation as needed. Patient stated understanding of, and was in agreement with,  the discussed instructions.    Other treatment options discussed with patient:  Further recommendations will be guided, in part, by the outcome of care. No further recommendations or referrals will be needed unless patient fails to adequately respond to conservative care.                 Aspiration pneumonia

## 2023-07-13 NOTE — PATIENT PROFILE ADULT. - AS SC BRADEN FRICTION
(2) potential problem Staging Info: By selecting yes to the question above you will include information on AJCC 8 tumor staging in your Mohs note. Information on tumor staging will be automatically added for SCCs on the head and neck. AJCC 8 includes tumor size, tumor depth, perineural involvement and bone invasion.

## 2024-12-10 NOTE — PROGRESS NOTE ADULT - PROBLEM SELECTOR PLAN 3
Discontinue Coumadin as risk from from A/C far outweighs the benefit at this time.
- hypotensive 2/2 infection, as per GOC, OK with IV fluids, can try oral midodrine, no aggressive measure beyond that (ie no central lines, no IV pressors etc)  - pressures improved today, if continues to be stable, can d/c IV fluids
Monitor off Coumadin as risk from from A/C far outweighs the benefit at this time.
- hypotensive 2/2 infection, as per GOC, OK with IV fluids, can try oral midodrine, no aggressive measure beyond that (ie no central lines, no IV pressors etc)  - continue with IV fluids for now, pressures bit better, can consider addition of PO midodrine via PEG tube if becomes hypotensive/not improving with fluids
Discontinue Coumadin as risk from from A/C far outweighs the benefit at this time.
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    Overall poor prognosis
Discontinue Coumadin as risk from from A/C far outweighs the benefit at this time.
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
- initially hypotensive 2/2 infection, as per GOC, OK with IV fluids, can try oral midodrine, no aggressive measure beyond that (ie no central lines, no IV pressors etc)  - pressures improved, remains stable thus far off IV fluids
- hypotensive 2/2 infection, as per GOC, OK with IV fluids, can try oral midodrine, no aggressive measure beyond that (ie no central lines, no IV pressors etc)  - continue with IV fluids for now, can consider addition of PO midodrine via PEG tube if still hypotensive
You can access the FollowMyHealth Patient Portal offered by [facility] by registering at the following website: [email or web address]. By joining [facility]’s FollowMyHealth portal, you will also be able to view your health information using other applications (apps) compatible with our system.

## 2025-01-29 NOTE — CONSULT NOTE ADULT - PROBLEM SELECTOR PROBLEM 1
Acute respiratory failure with hypoxia [Knee] : patellar 2+ and symmetric bilaterally [Ankle] : ankle 2+ and symmetric bilaterally [Normal] : No costovertebral angle tenderness and no spinal tenderness [Antalgic] : antalgic [Wide-Based] : wide-based [LE] : Sensory: Intact in bilateral lower extremities [de-identified] : MRI of the lumbar spine done on 1/26/2025 EXAM: [id number] - MR SPINE LUMBAR  - ORDERED BY: LANETTE HE   PROCEDURE DATE:  01/07/2025    INTERPRETATION:  Exam Type: MR LUMBAR SPINE Exam Date and Time: 1/7/2025 9:23 AM Indication: Status post spinal fusion. Pain. Additional Clinical Information: Other Condition see Clinical Info Comparison: Lumbar spine x-rays 7/19/2012.  TECHNIQUE: Multiplanar multisequence MRI of the lumbar spine was performed.  FINDINGS: OSSEOUS STRUCTURES: Patient is status post posterior decompression and fusion at L4-L5. Susceptibility artifact accompanies the hardware which is otherwise intact. There is mild retrolisthesis of L2 over L3 and of L3 over L4. Mild anterolisthesis of L5 over S1. Vertebral body heights are preserved without compression deformity. Degenerative endplate changes are seen at L1-L2 and L2-L3 anteriorly. Background marrow signal is normal without infiltrative marrow process. No suspicious osseous lesions are identified. The sacroiliac joints are normal.  SPINAL CANAL: The conus is normal in caliber and signal, terminating at the L1 level. There is bunching of the nerve roots in the thecal sac related to spinal canal narrowing.  EXTRASPINAL: Expected postoperative scarring is noted in the dorsal paraspinous soft tissues. There is symmetric fatty infiltration of the lower dorsal paraspinous musculature. Gallstones within the gallbladder. Left renal cyst. Colonic diverticulosis.  DISC SPACES: There is multilevel degenerative disc disease characterized by loss of normal disc height and signal. Level-by-level analysis demonstrates the following:  T11-T12 (included on sagittal images only): No disc bulge or herniation. Ligamentum flavum thickening. No spinal canal or neural foraminal narrowing.  T12-L1: Disc bulge with small central disc protrusion.. No spinal canal or neural foraminal narrowing.  L1-L2: Disc bulge with left paracentral disc protrusion. No right foraminal narrowing. Mild left foraminal narrowing. Effacement of the ventral thecal sac without spinal canal narrowing.  L2-L3: Large disc bulge. Ligamentum flavum thickening. The bilateral subarticular zones. Moderate to severe right and moderate left foraminal narrowing. Moderate spinal canal narrowing.  L3-L4: Disc osteophyte complex. Bilateral facet joint arthrosis and ligamentum flavum thickening. Effacement of the bilateral subarticular zones. Severe bilateral foraminal narrowing. Severe spinal canal narrowing.  L4-L5: Postsurgical changes. Disc bulge. Ligamentum flavum thickening and bilateral facet arthrosis. Severe right and left foraminal narrowing. No spinal canal narrowing.  L5-S1: Disc bulge. Bilateral facet joint arthrosis and ligamentum flavum thickening. Severe bilateral foraminal narrowing. Effacement of the ventral thecal sac without spinal canal narrowing.   IMPRESSION: Postsurgical changes at L4-L5 with multilevel degenerative changes resulting in up to severe spinal canal narrowing and severe neural foraminal narrowing, preferentially at L3-L4, as described.  --- End of Report ---       VITA LOERA MD; Attending Radiologist This document has been electronically signed. Jan 9 2025  8:59AM   Personal review of this MRI shows a central and left-sided herniated disc at L5-S1 consistent with the patient's symptoms.